# Patient Record
Sex: FEMALE | Race: WHITE | NOT HISPANIC OR LATINO | Employment: FULL TIME | URBAN - METROPOLITAN AREA
[De-identification: names, ages, dates, MRNs, and addresses within clinical notes are randomized per-mention and may not be internally consistent; named-entity substitution may affect disease eponyms.]

---

## 2018-06-01 LAB — PAP SMEAR - HIM PATIENT REPORTED: NEGATIVE

## 2019-02-08 ENCOUNTER — OFFICE VISIT (OUTPATIENT)
Dept: FAMILY MEDICINE | Facility: CLINIC | Age: 29
End: 2019-02-08
Payer: COMMERCIAL

## 2019-02-08 VITALS
RESPIRATION RATE: 16 BRPM | OXYGEN SATURATION: 99 % | WEIGHT: 142.4 LBS | TEMPERATURE: 97.8 F | HEART RATE: 86 BPM | DIASTOLIC BLOOD PRESSURE: 64 MMHG | BODY MASS INDEX: 25.23 KG/M2 | SYSTOLIC BLOOD PRESSURE: 106 MMHG | HEIGHT: 63 IN

## 2019-02-08 DIAGNOSIS — F33.1 MODERATE EPISODE OF RECURRENT MAJOR DEPRESSIVE DISORDER (H): ICD-10-CM

## 2019-02-08 DIAGNOSIS — F41.1 GAD (GENERALIZED ANXIETY DISORDER): ICD-10-CM

## 2019-02-08 DIAGNOSIS — F51.01 PRIMARY INSOMNIA: ICD-10-CM

## 2019-02-08 DIAGNOSIS — Z00.00 ROUTINE HISTORY AND PHYSICAL EXAMINATION OF ADULT: Primary | ICD-10-CM

## 2019-02-08 DIAGNOSIS — N83.201 CYST OF RIGHT OVARY: ICD-10-CM

## 2019-02-08 DIAGNOSIS — E06.3 HYPOTHYROIDISM DUE TO HASHIMOTO'S THYROIDITIS: ICD-10-CM

## 2019-02-08 PROCEDURE — 36415 COLL VENOUS BLD VENIPUNCTURE: CPT | Performed by: FAMILY MEDICINE

## 2019-02-08 PROCEDURE — 80061 LIPID PANEL: CPT | Performed by: FAMILY MEDICINE

## 2019-02-08 PROCEDURE — 84443 ASSAY THYROID STIM HORMONE: CPT | Performed by: FAMILY MEDICINE

## 2019-02-08 PROCEDURE — 99385 PREV VISIT NEW AGE 18-39: CPT | Performed by: FAMILY MEDICINE

## 2019-02-08 PROCEDURE — 82947 ASSAY GLUCOSE BLOOD QUANT: CPT | Performed by: FAMILY MEDICINE

## 2019-02-08 PROCEDURE — 99214 OFFICE O/P EST MOD 30 MIN: CPT | Mod: 25 | Performed by: FAMILY MEDICINE

## 2019-02-08 RX ORDER — PAROXETINE 20 MG/1
20 TABLET, FILM COATED ORAL EVERY MORNING
Qty: 90 TABLET | Refills: 0 | Status: SHIPPED | OUTPATIENT
Start: 2019-02-08 | End: 2020-12-07

## 2019-02-08 RX ORDER — ALPRAZOLAM 1 MG
1 TABLET ORAL 3 TIMES DAILY PRN
COMMUNITY
End: 2019-02-08

## 2019-02-08 RX ORDER — PAROXETINE 20 MG/1
20 TABLET, FILM COATED ORAL EVERY MORNING
COMMUNITY
End: 2019-02-08

## 2019-02-08 RX ORDER — LEVOTHYROXINE SODIUM 25 UG/1
25 TABLET ORAL DAILY
COMMUNITY
End: 2022-05-11

## 2019-02-08 RX ORDER — ALPRAZOLAM 1 MG
1 TABLET ORAL 3 TIMES DAILY PRN
Qty: 30 TABLET | Refills: 0 | Status: SHIPPED | OUTPATIENT
Start: 2019-02-08 | End: 2024-03-01

## 2019-02-08 ASSESSMENT — ENCOUNTER SYMPTOMS
JOINT SWELLING: 0
SORE THROAT: 0
CONSTIPATION: 0
DIZZINESS: 0
HEARTBURN: 0
BREAST MASS: 0
SHORTNESS OF BREATH: 0
NERVOUS/ANXIOUS: 1
PALPITATIONS: 0
EYE PAIN: 0
HEMATURIA: 0
ARTHRALGIAS: 0
FEVER: 0
HEADACHES: 0
NAUSEA: 0
MYALGIAS: 0
WEAKNESS: 0
PARESTHESIAS: 0
COUGH: 0
HEMATOCHEZIA: 0
DYSURIA: 0
FREQUENCY: 0
ABDOMINAL PAIN: 0
CHILLS: 0
DIARRHEA: 0

## 2019-02-08 ASSESSMENT — PATIENT HEALTH QUESTIONNAIRE - PHQ9
SUM OF ALL RESPONSES TO PHQ QUESTIONS 1-9: 13
5. POOR APPETITE OR OVEREATING: SEVERAL DAYS

## 2019-02-08 ASSESSMENT — ANXIETY QUESTIONNAIRES
IF YOU CHECKED OFF ANY PROBLEMS ON THIS QUESTIONNAIRE, HOW DIFFICULT HAVE THESE PROBLEMS MADE IT FOR YOU TO DO YOUR WORK, TAKE CARE OF THINGS AT HOME, OR GET ALONG WITH OTHER PEOPLE: SOMEWHAT DIFFICULT
3. WORRYING TOO MUCH ABOUT DIFFERENT THINGS: NEARLY EVERY DAY
6. BECOMING EASILY ANNOYED OR IRRITABLE: NEARLY EVERY DAY
GAD7 TOTAL SCORE: 15
5. BEING SO RESTLESS THAT IT IS HARD TO SIT STILL: SEVERAL DAYS
2. NOT BEING ABLE TO STOP OR CONTROL WORRYING: NEARLY EVERY DAY
7. FEELING AFRAID AS IF SOMETHING AWFUL MIGHT HAPPEN: SEVERAL DAYS
1. FEELING NERVOUS, ANXIOUS, OR ON EDGE: NEARLY EVERY DAY

## 2019-02-08 ASSESSMENT — MIFFLIN-ST. JEOR: SCORE: 1340.05

## 2019-02-08 NOTE — PROGRESS NOTES
SUBJECTIVE:   CC: Fiona Henry is an 28 year old woman who presents for preventive health visit.     Physical   Annual:     Getting at least 3 servings of Calcium per day:  Yes    Bi-annual eye exam:  Yes    Dental care twice a year:  Yes    Sleep apnea or symptoms of sleep apnea:  None    Diet:  Regular (no restrictions)    Frequency of exercise:  2-3 days/week    Duration of exercise:  15-30 minutes    Taking medications regularly:  Yes    Medication side effects:  None    Additional concerns today:  Yes    PHQ-2 Total Score: 2    Pt requesting refills on her Medications.     Anxiety/depression Follow-Up    Status since last visit: pt struggled with MDD and anxiety/insomnia, and was started on Paxil with significant improvement, and she is taking Xanax as needed, and she takes it about it 2 to 3 times a week.    Other associated symptoms:has been having slightly more panic attacks due to driving on ice.    Complicating factors:   Significant life event: Yes-  Moved from [place] lately.   Current substance abuse: None  Depression symptoms: Yes-    No flowsheet data found.    ASIA-7  Hypothyroidism Follow-up      Since last visit, patient describes the following symptoms: Weight stable, no hair loss, no skin changes, no constipation, no loose stools      Today's PHQ-2 Score:   PHQ-2 ( 1999 [employer]) 2/8/2019   Q1: Little interest or pleasure in doing things 1   Q2: Feeling down, depressed or hopeless 1   PHQ-2 Score 2   Q1: Little interest or pleasure in doing things Several days   Q2: Feeling down, depressed or hopeless Several days   PHQ-2 Score 2       Abuse: Current or Past(Physical, Sexual or Emotional)- No  Do you feel safe in your environment? Yes    Social History     Tobacco Use     Smoking status: Not on file   Substance Use Topics     Alcohol use: Not on file     Alcohol Use 2/8/2019   If you drink alcohol do you typically have greater than 3 drinks per day OR greater than 7 drinks per week? No        Reviewed orders with patient.  Reviewed health maintenance and updated orders accordingly - Yes  Labs reviewed in EPIC  BP Readings from Last 3 Encounters:   02/08/19 106/64    Wt Readings from Last 3 Encounters:   02/08/19 64.6 kg (142 lb 6.4 oz)                  Patient Active Problem List   Diagnosis     Moderate episode of recurrent major depressive disorder (H)     Hypothyroidism due to Hashimoto's thyroiditis     ASIA (generalized anxiety disorder)     Primary insomnia     Cyst of right ovary     History reviewed. No pertinent surgical history.    Social History     Tobacco Use     Smoking status: Never Smoker     Smokeless tobacco: Never Used   Substance Use Topics     Alcohol use: Yes     Comment: Rarely     Family History   Problem Relation Age of Onset     Bipolar Disorder Mother      Diabetes Father      Heart Disease Father         Hx Triple Bypass         Current Outpatient Medications   Medication Sig Dispense Refill     ALPRAZolam (XANAX) 1 MG tablet Take 1 tablet (1 mg) by mouth 3 times daily as needed for anxiety 30 tablet 0     levothyroxine (SYNTHROID/LEVOTHROID) 25 MCG tablet Take 25 mcg by mouth daily       PARoxetine (PAXIL) 20 MG tablet Take 1 tablet (20 mg) by mouth every morning 90 tablet 0     No Known Allergies    Mammogram not appropriate for this patient based on age.    Pertinent mammograms are reviewed under the imaging tab.  History of abnormal Pap smear: NO - age 30-65 PAP every 5 years with negative HPV co-testing recommended     Reviewed and updated as needed this visit by clinical staff         Reviewed and updated as needed this visit by Provider        History reviewed. No pertinent past medical history.   History reviewed. No pertinent surgical history.    Review of Systems   Constitutional: Negative for chills and fever.   HENT: Negative for congestion, ear pain, hearing loss and sore throat.    Eyes: Negative for pain and visual disturbance.   Respiratory: Negative for  cough and shortness of breath.    Cardiovascular: Negative for chest pain, palpitations and peripheral edema.   Gastrointestinal: Negative for abdominal pain, constipation, diarrhea, heartburn, hematochezia and nausea.   Breasts:  Negative for tenderness, breast mass and discharge.   Genitourinary: Negative for dysuria, frequency, genital sores, hematuria, pelvic pain, urgency, vaginal bleeding and vaginal discharge.   Musculoskeletal: Negative for arthralgias, joint swelling and myalgias.   Skin: Negative for rash.   Neurological: Negative for dizziness, weakness, headaches and paresthesias.   Psychiatric/Behavioral: Positive for mood changes. The patient is nervous/anxious.         OBJECTIVE:   There were no vitals taken for this visit.  Physical Exam  GENERAL: healthy, alert and no distress  EYES: Eyes grossly normal to inspection, PERRL and conjunctivae and sclerae normal  HENT: ear canals and TM's normal, nose and mouth without ulcers or lesions  NECK: no adenopathy, no asymmetry, masses, or scars and thyroid normal to palpation  RESP: lungs clear to auscultation - no rales, rhonchi or wheezes  CV: regular rate and rhythm, normal S1 S2, no S3 or S4, no murmur, click or rub, no peripheral edema and peripheral pulses strong  ABDOMEN: soft, nontender, no hepatosplenomegaly, no masses and bowel sounds normal  MS: no gross musculoskeletal defects noted, no edema  SKIN: no suspicious lesions or rashes  NEURO: Normal strength and tone, mentation intact and speech normal  PSYCH: mentation appears normal, affect normal/bright        ASSESSMENT/PLAN:   1. Moderate episode of recurrent major depressive disorder (H)  Refer to pyscyhiatry for medications management. Will give 1 month supply of ativan, and also refill paxil for 90 days.  - MENTAL HEALTH REFERRAL  - Adult; Psychiatry and Medication Management; Psychiatry; Other: Not Listed - Enter Referral Details in Scheduling Comments Below; Patient call to schedule  -  PARoxetine (PAXIL) 20 MG tablet; Take 1 tablet (20 mg) by mouth every morning  Dispense: 90 tablet; Refill: 0    2. Hypothyroidism due to Hashimoto's thyroiditis  Stable, check   - TSH and refill meds after the results are back.    3. ASIA (generalized anxiety disorder)  As above.  - MENTAL HEALTH REFERRAL  - Adult; Psychiatry and Medication Management; Psychiatry; Other: Not Listed - Enter Referral Details in Scheduling Comments Below; Patient call to schedule  - ALPRAZolam (XANAX) 1 MG tablet; Take 1 tablet (1 mg) by mouth 3 times daily as needed for anxiety  Dispense: 30 tablet; Refill: 0    4. Primary insomnia  Talked about sleep hygeiene.    5. Cyst of right ovary      6. Routine history and physical examination of adult  Check below tests.  - Lipid panel reflex to direct LDL Fasting  - Glucose    COUNSELING:  Reviewed preventive health counseling, as reflected in patient instructions       Regular exercise       Healthy diet/nutrition    BP Readings from Last 1 Encounters:   No data found for BP     There is no height or weight on file to calculate BMI.      Weight management plan: Discussed healthy diet and exercise guidelines     has no tobacco history on file.      Counseling Resources:  ATP IV Guidelines  Pooled Cohorts Equation Calculator  Breast Cancer Risk Calculator  FRAX Risk Assessment  ICSI Preventive Guidelines  Dietary Guidelines for Americans, 2010  USDA's MyPlate  ASA Prophylaxis  Lung CA Screening    Vandana Castillo MD  [facility]

## 2019-02-08 NOTE — LETTER
February 11, 2019      Fiona Gregorylisa  [address] [place] [address]        Dear ,    Your tests are all within normal including : cholesterol test, blood sugar and thyroid test.   Resulted Orders   Lipid panel reflex to direct LDL Fasting   Result Value Ref Range    Cholesterol 189 <200 mg/dL    Triglycerides 136 <150 mg/dL      Comment:      Fasting specimen    HDL Cholesterol 42 (L) >49 mg/dL    LDL Cholesterol Calculated 120 (H) <100 mg/dL      Comment:      Above desirable:  100-129 mg/dl  Borderline High:  130-159 mg/dL  High:             160-189 mg/dL  Very high:       >189 mg/dl      Non HDL Cholesterol 147 (H) <130 mg/dL      Comment:      Above Desirable:  130-159 mg/dl  Borderline high:  160-189 mg/dl  High:             190-219 mg/dl  Very high:       >219 mg/dl     TSH   Result Value Ref Range    TSH 0.53 0.40 - 4.00 mU/L   Glucose   Result Value Ref Range    Glucose 84 70 - 99 mg/dL      Comment:      Fasting specimen       If you have any questions or concerns, please call the clinic at the number listed above.       Sincerely,        Vandana Castillo MD

## 2019-02-09 LAB
CHOLEST SERPL-MCNC: 189 MG/DL
GLUCOSE SERPL-MCNC: 84 MG/DL (ref 70–99)
HDLC SERPL-MCNC: 42 MG/DL
LDLC SERPL CALC-MCNC: 120 MG/DL
NONHDLC SERPL-MCNC: 147 MG/DL
TRIGL SERPL-MCNC: 136 MG/DL
TSH SERPL DL<=0.005 MIU/L-ACNC: 0.53 MU/L (ref 0.4–4)

## 2019-02-09 ASSESSMENT — ANXIETY QUESTIONNAIRES: GAD7 TOTAL SCORE: 15

## 2019-03-15 ENCOUNTER — TRANSFERRED RECORDS (OUTPATIENT)
Dept: HEALTH INFORMATION MANAGEMENT | Facility: CLINIC | Age: 29
End: 2019-03-15

## 2019-03-28 ENCOUNTER — HOSPITAL ENCOUNTER (EMERGENCY)
Facility: CLINIC | Age: 29
Discharge: HOME | End: 2019-03-28
Admitting: PHYSICIAN ASSISTANT
Payer: COMMERCIAL

## 2019-03-28 VITALS
SYSTOLIC BLOOD PRESSURE: 108 MMHG | DIASTOLIC BLOOD PRESSURE: 69 MMHG | RESPIRATION RATE: 16 BRPM | TEMPERATURE: 98.6 F | OXYGEN SATURATION: 98 %

## 2019-03-28 DIAGNOSIS — M54.42 BILATERAL LOW BACK PAIN WITH LEFT-SIDED SCIATICA: ICD-10-CM

## 2019-03-28 PROCEDURE — 99283 EMERGENCY DEPT VISIT LOW MDM: CPT

## 2019-03-28 PROCEDURE — 25000132 ZZH RX MED GY IP 250 OP 250 PS 637: Performed by: PHYSICIAN ASSISTANT

## 2019-03-28 RX ORDER — IBUPROFEN 600 MG/1
600 TABLET, FILM COATED ORAL ONCE
Status: COMPLETED | OUTPATIENT
Start: 2019-03-28 | End: 2019-03-28

## 2019-03-28 RX ORDER — METHOCARBAMOL 750 MG/1
750-1500 TABLET, FILM COATED ORAL 3 TIMES DAILY PRN
Qty: 30 TABLET | Refills: 0 | Status: SHIPPED | OUTPATIENT
Start: 2019-03-28 | End: 2019-04-02

## 2019-03-28 RX ORDER — LIDOCAINE 4 G/G
2 PATCH TOPICAL ONCE
Status: DISCONTINUED | OUTPATIENT
Start: 2019-03-28 | End: 2019-03-28 | Stop reason: HOSPADM

## 2019-03-28 RX ORDER — DIAZEPAM 5 MG
5 TABLET ORAL ONCE
Status: COMPLETED | OUTPATIENT
Start: 2019-03-28 | End: 2019-03-28

## 2019-03-28 RX ORDER — LIDOCAINE 50 MG/G
2 PATCH TOPICAL EVERY 24 HOURS
Qty: 10 PATCH | Refills: 0 | Status: SHIPPED | OUTPATIENT
Start: 2019-03-28 | End: 2019-04-02

## 2019-03-28 RX ORDER — METHYLPREDNISOLONE 4 MG
TABLET, DOSE PACK ORAL
Qty: 21 TABLET | Refills: 0 | Status: SHIPPED | OUTPATIENT
Start: 2019-03-28 | End: 2019-04-02

## 2019-03-28 RX ORDER — ACETAMINOPHEN 500 MG
1000 TABLET ORAL ONCE
Status: COMPLETED | OUTPATIENT
Start: 2019-03-28 | End: 2019-03-28

## 2019-03-28 RX ADMIN — LIDOCAINE 2 PATCH: 560 PATCH PERCUTANEOUS; TOPICAL; TRANSDERMAL at 12:43

## 2019-03-28 RX ADMIN — IBUPROFEN 600 MG: 600 TABLET ORAL at 12:42

## 2019-03-28 RX ADMIN — DIAZEPAM 5 MG: 5 TABLET ORAL at 12:43

## 2019-03-28 RX ADMIN — ACETAMINOPHEN 1000 MG: 500 TABLET, FILM COATED ORAL at 12:42

## 2019-03-28 ASSESSMENT — ENCOUNTER SYMPTOMS
NUMBNESS: 0
FEVER: 0
CONSTIPATION: 0
CHILLS: 0
DIARRHEA: 0

## 2019-03-28 NOTE — ED TRIAGE NOTES
Pt presents to ED for evaluation of sharp spasming pain across her lower back.  States the pain radiates down both of her legs.  Pain started on Wednesday morning, pt had a difficult time sleeping last night because of the discomfort.  Pt took two Aleve this morning and also took 1 percocet but reports minimal relief in pain.

## 2019-03-28 NOTE — ED NOTES
AMbulated pt.  Pt is now able to get out of bed, walk, sit and get back into bed.  Pt states her pain is much better than upon arrival.

## 2019-03-28 NOTE — ED PROVIDER NOTES
"  History     Chief Complaint:  Back Pain    The history is provided by the patient.      Fiona Henry is a 28 year old female with a history of anxiety and depression who presents to the emergency department with a friend for evaluation of back pain. Of note, the patient has a history of back spasms that have been able to be controlled at home with Aleve. Starting yesterday morning, after a \"bad night of sleep,\" she endorses worsening back spasms and pain radiating down the left leg. She states that she tried Aleve and 1 Percocet this morning, without relief, prompting her to seek further evaluation here in the ED. Here, the patient complains of the uncontrolled bilateral low back spasms with pain radiating down the left leg in addition to nausea and diaphoresis. She denies any recent falls or trauma to the back in addition to denying any fevers, chills, urinary or bowel movement irregularities, numbness or tingling or history of cancer in herself.     Allergies:  NKDA    Medications:    Alprazolam  Levothyroxine  Paroxetine  Venlafaxine  Loestrin    Past Medical History:    Anxiety  Depression  Insomnia  Ovarian Cyst  Hypothyroidism  PCOS    Past Surgical History:    Tonsillectomy    Family History:    Bipolar Disorder  Diabetes  Heart Disease: Father    Social History:  Marital Status:   [2]  Tobacco Use: No  Alcohol Use: No    Review of Systems   Constitutional: Negative for chills and fever.   Gastrointestinal: Negative for constipation and diarrhea.   Genitourinary: Negative.    Musculoskeletal:        Bilateral Low Back Pain  Left Leg Pain   Neurological: Negative for numbness.   All other systems reviewed and are negative.    Physical Exam     Patient Vitals for the past 24 hrs:   BP Temp Temp src Heart Rate Resp SpO2   03/28/19 1148 120/71 98.6  F (37  C) Oral 97 16 99 %       Physical Exam  General: Alert and cooperative with exam. Tearful and very uncomfortable appearing.   Head:  Scalp is " "NC/AT  Eyes:  No scleral icterus, PERRL  ENT:  The external nose and ears are normal.   Neck:  Normal range of motion without rigidity.  CV:  Regular rate and rhythm    No pathologic murmur, rubs, or gallops.  Resp:  Breath sounds are clear bilaterally.  No crackles, wheezes, rhonchi.    Non-labored, no retractions or accessory muscle use  GI:  Abdomen is soft, no distension, no tenderness, no masses. No peritoneal signs.  Bowel sounds present in all quadrants  :  No suprapubic or flank tenderness  MS:  No lower extremity edema or asymmetric calf swelling.    No midline cervical, thoracic, or lumbar tenderness    Diffuse bilateral spasms in the paralumbar region.   Skin:  Warm and dry, No rash or lesions noted.  Neuro: Oriented. No gross motor deficits.    Strength and sensation grossly intact in all 4 extremities. GCS 15. Gait normal  Psych: Awake. Alert. Normal affect. Appropriate interactions.      Emergency Department Course     Interventions:  1242 Tylenol 1000 mg, PO  1242 Ibuprofen 600 mg, PO  1243 Valium 5 mg, PO  1243 Lidocaine 4% Patch, 2 Patches, Transdermal    Emergency Department Course:  1221 Nursing notes and vitals reviewed. I performed an exam of the patient as documented above.     IV inserted. Medicine administered as documented above.     1321 I rechecked the patient and discussed the results of her workup thus far. Patient's pain is improved.     The patient passed a \"road test\" prior to discharge from the ED.    Findings and plan explained to the patient. Patient discharged home with instructions regarding supportive care, medications, and reasons to return. The importance of close follow-up was reviewed.     I personally answered all related questions prior to discharge.    Impression & Plan      Medical Decision Making:  Fiona Henry is a 28 year old female who presented with low back pain.  Patient is well appearing with normal vitals.  Pain has improved with interventions in the " emergency department.  The patient did not sustain any trauma and has no midline spinous tenderness.  Therefore x-rays are not necessary due to the low likelihood of fracture or subluxation. The patient has not had fever, chills, IV drug use, saddle anesthesia, or bowel or bladder dysfunction.  No history or symptoms of malignancy and no recent surgical intervention.  There is no clinical evidence of cauda equina syndrome, spinal epidural abscess, osteomyelitis, cord compression. No evidence of abdominal pain/tenderness or urinary symptoms and doubt referred pain from GI or  cause.    The neurovascular exam is normal and the patient's symptoms are consistent with musculoskeletal strain with left sided sciatica given radiation of pain down posterior-lateral portion of leg. The patient will be discharged with medications as below to use as directed, and advised to use OTC analgesics.  Ice or heat to the back and stretching exercises.  No heavy lifting, bending or twisting. Return if increasing pain, numbness, weakness, fever, chills, or bowel or bladder dysfunction.  They should schedule follow-up with their doctor within 3 days.     Diagnosis:    ICD-10-CM    1. Bilateral low back pain with left-sided sciatica M54.42        Disposition:  discharged to home    Discharge Medications:     Medication List      Started    lidocaine 5 % patch  Commonly known as:  LIDODERM  2 patches, Transdermal, EVERY 24 HOURS     methocarbamol 750 MG tablet  Commonly known as:  ROBAXIN  750-1,500 mg, Oral, 3 TIMES DAILY PRN     methylPREDNISolone 4 MG tablet therapy pack  Commonly known as:  MEDROL DOSEPAK  Follow Package Directions          Scribe Disclosure:  I, Hu Panda, am serving as a scribe on 3/28/2019 at 12:05 PM to personally document services performed by SAMAN Whipple, based on my observations and the provider's statements to me.     Hu Panda  3/28/2019   [facility] EMERGENCY DEPARTMENT       Fer  Rufus Aguilera PA-C  03/28/19 8201

## 2019-03-28 NOTE — ED AVS SNAPSHOT
[facility] Emergency Department  [address]  [facility] [address]  Phone:  [phone]  Fax:  [phone]                                    Fiona Henry   MRN: [id number]    Department:  [facility] Emergency Department   Date of Visit:  3/28/2019           After Visit Summary Signature Page    I have received my discharge instructions, and my questions have been answered. I have discussed any challenges I see with this plan with the nurse or doctor.    ..........................................................................................................................................  Patient/Patient Representative Signature      ..........................................................................................................................................  Patient Representative Print Name and Relationship to Patient    ..................................................               ................................................  Date                                   Time    ..........................................................................................................................................  Reviewed by Signature/Title    ...................................................              ..............................................  Date                                               Time          22EPIC Rev 08/18

## 2019-04-02 ENCOUNTER — OFFICE VISIT (OUTPATIENT)
Dept: FAMILY MEDICINE | Facility: CLINIC | Age: 29
End: 2019-04-02
Payer: COMMERCIAL

## 2019-04-02 VITALS
SYSTOLIC BLOOD PRESSURE: 103 MMHG | HEART RATE: 101 BPM | DIASTOLIC BLOOD PRESSURE: 73 MMHG | RESPIRATION RATE: 14 BRPM | TEMPERATURE: 97.9 F | WEIGHT: 138 LBS | BODY MASS INDEX: 24.45 KG/M2

## 2019-04-02 DIAGNOSIS — M54.42 ACUTE BILATERAL LOW BACK PAIN WITH LEFT-SIDED SCIATICA: Primary | ICD-10-CM

## 2019-04-02 PROCEDURE — 99213 OFFICE O/P EST LOW 20 MIN: CPT | Performed by: PHYSICIAN ASSISTANT

## 2019-04-02 RX ORDER — CYCLOBENZAPRINE HCL 10 MG
10 TABLET ORAL 3 TIMES DAILY PRN
Qty: 30 TABLET | Refills: 1 | Status: SHIPPED | OUTPATIENT
Start: 2019-04-02 | End: 2019-07-01

## 2019-04-02 NOTE — PROGRESS NOTES
"  SUBJECTIVE:   Fiona Henry is a 28 year old female who presents to clinic today for the following health issues:      ED/UC Followup: Bilateral low back pain with left-sided sciatica    Facility:  [facility]  Date of visit: 3/28/2019  Reason for visit: could not walk due to back pain  Current Status: feeling better, much more mobile.  Has had pinched nerve previously, and resolved a lot quicker.  Not sure what medication is causing nausea.  Experienced \"flushing\" when taking the oral steroid.  Finished steroid today. Of note, does not believe robaxin is adding in symptoms. Has used flexeril in the past with improvement. Taking naproxen bid as well with some improvement .           Problem list and histories reviewed & adjusted, as indicated.  Additional history: as documented    Patient Active Problem List   Diagnosis     Moderate episode of recurrent major depressive disorder (H)     Hypothyroidism due to Hashimoto's thyroiditis     ASIA (generalized anxiety disorder)     Primary insomnia     Cyst of right ovary     History reviewed. No pertinent surgical history.    Social History     Tobacco Use     Smoking status: Never Smoker     Smokeless tobacco: Never Used   Substance Use Topics     Alcohol use: Yes     Comment: Rarely     Family History   Problem Relation Age of Onset     Bipolar Disorder Mother      Diabetes Father      Heart Disease Father         Hx Triple Bypass         Current Outpatient Medications   Medication Sig Dispense Refill     ALPRAZolam (XANAX) 1 MG tablet Take 1 tablet (1 mg) by mouth 3 times daily as needed for anxiety 30 tablet 0     cyclobenzaprine (FLEXERIL) 10 MG tablet Take 1 tablet (10 mg) by mouth 3 times daily as needed for muscle spasms 30 tablet 1     levothyroxine (SYNTHROID/LEVOTHROID) 25 MCG tablet Take 25 mcg by mouth daily       lidocaine (LIDODERM) 5 % patch Place 2 patches onto the skin every 24 hours for 5 days 10 patch 0     PARoxetine (PAXIL) 20 MG tablet Take 1 " tablet (20 mg) by mouth every morning 90 tablet 0     VENLAFAXINE HCL PO        No Known Allergies  BP Readings from Last 3 Encounters:   04/02/19 103/73   03/28/19 108/69   02/08/19 106/64    Wt Readings from Last 3 Encounters:   04/02/19 62.6 kg (138 lb)   02/08/19 64.6 kg (142 lb 6.4 oz)                    Reviewed and updated as needed this visit by clinical staff  Tobacco  Allergies  Meds  Problems  Med Hx  Surg Hx  Fam Hx  Soc Hx        Reviewed and updated as needed this visit by Provider  Tobacco  Allergies  Meds  Problems  Med Hx  Surg Hx  Fam Hx         ROS:  Constitutional, msk, neuro, cardiovascular, pulmonary, gi and gu systems are negative, except as otherwise noted.    OBJECTIVE:     /73 (BP Location: Right arm, Patient Position: Chair, Cuff Size: Adult Regular)   Pulse 101   Temp 97.9  F (36.6  C) (Oral)   Resp 14   Wt 62.6 kg (138 lb)   LMP 02/28/2019 (Exact Date)   BMI 24.45 kg/m    Body mass index is 24.45 kg/m .  GENERAL: healthy, alert and no distress  Comprehensive back pain exam:  Tenderness of L1-S1, paralumbar muscular, left sciatic notch and left SI joint, Pain limits the following motions: flexion and bilateral rotation, Lower extremity strength functional and equal on both sides, Lower extremity reflexes within normal limits bilaterally, Lower extremity sensation normal and equal on both sides and Straight leg raise negative bilaterally  PSYCH: mentation appears normal, affect normal/bright    Diagnostic Test Results:  none     ASSESSMENT/PLAN:     (M54.42) Acute bilateral low back pain with left-sided sciatica  (primary encounter diagnosis)  Comment: improved, but symptoms continued. Will continue scheduled nsaids and will change to prn flexeril. Therapy recommended as well. If no continued improvement over the next 4 weeks or worsening, MRI to be obtained.   Plan: cyclobenzaprine (FLEXERIL) 10 MG tablet, VETO         PT, HAND, AND CHIROPRACTIC REFERRAL         -Medication use and side effects discussed with the patient. Patient is in complete understanding and agreement with plan.       Follow up: as above     Salbador Pabon PA-C  [facility]

## 2019-04-02 NOTE — PATIENT INSTRUCTIONS
Kirk Murillo    [address]  [place] [address]    Phone: [phone]  Email: [email or web address]          Patient Education     Back Care Tips    Caring for your back  These are things you can do to prevent a recurrence of acute back pain and to reduce symptoms from chronic back pain:    Maintain a healthy weight. If you are overweight, losing weight will help most types of back pain.    Exercise is an important part of recovery from most types of back pain. The muscles behind and in front of the spine support the back. This means strengthening both the back muscles and the abdominal muscles will provide better support for your spine.     Swimming and brisk walking are good overall exercises to improve your fitness level.    Practice safe lifting methods (below).    Practice good posture when sitting, standing and walking. Avoid prolonged sitting. This puts more stress on the lower back than standing or walking.    Wear quality shoes with sufficient arch support. Foot and ankle alignment can affect back symptoms. Women should avoid wearing high heels.    Therapeutic massage can help relax the back muscles without stretching them.    During the first 24 to 72 hours after an acute injury or flare-up of chronic back pain, apply an ice pack to the painful area for 20 minutes and then remove it for 20 minutes, over a period of 60 to 90 minutes, or several times a day. As a safety precaution, do not use a heating pad at bedtime. Sleeping on a heating pad can lead to skin burns or tissue damage.    You can alternate ice and heat therapies.  Medicines  Talk to your healthcare provider before using medicines, especially if you have other medical problems or are taking other medicines.    You may use acetaminophen or ibuprofen to control pain, unless your healthcare provider prescribed other pain medicine. If you have chronic conditions like diabetes, liver or kidney disease, stomach ulcers, or gastrointestinal  bleeding, or are taking blood thinners, talk with your healthcare provider before taking any medicines.    Be careful if you are given prescription pain medicines, narcotics, or medicine for muscle spasm. They can cause drowsiness, affect your coordination, reflexes, and judgment. Do not drive or operate heavy machinery while taking these types of medicines. Take prescription pain medicine only as prescribed by your healthcare provider.  Lumbar stretch  Here is a simple stretching exercise that will help relax muscle spasm and keep your back more limber. If exercise makes your back pain worse, don t do it.    Lie on your back with your knees bent and both feet on the ground.    Slowly raise your left knee to your chest as you flatten your lower back against the floor. Hold for 5 seconds.    Relax and repeat the exercise with your right knee.    Do 10 of these exercises for each leg.  Safe lifting method    Don t bend over at the waist to lift an object off the floor.  Instead, bend your knees and hips in a squat.     Keep your back and head upright    Hold the object close to your body, directly in front of you.    Straighten your legs to lift the object.     Lower the object to the floor in the reverse fashion.    If you must slide something across the floor, push it.  Posture tips  Sitting  Sit in chairs with straight backs or low-back support. Keep your knees lower than your hips, with your feet flat on the floor.  When driving, sit up straight. Adjust the seat forward so you are not leaning toward the steering wheel.  A small pillow or rolled towel behind your lower back may help if you are driving long distances.   Standing  When standing for long periods, shift most of your weight to one leg at a time. Alternate legs every few minutes.   Sleeping  The best way to sleep is on your side with your knees bent. Put a low pillow under your head to support your neck in a neutral spine position. Avoid thick pillows  that bend your neck to one side. Put a pillow between your legs to further relax your lower back. If you sleep on your back, put pillows under your knees to support your legs in a slightly flexed position. Use a firm mattress. If your mattress sags, replace it, or use a 1/2-inch plywood board under the mattress to add support.  Follow-up care  Follow up with your healthcare provider, or as advised.  If X-rays, a CT scan or an MRI scan were taken, they will be reviewed by a radiologist. You will be notified of any new findings that may affect your care.  Call 911  Call 911 if any of the following occur:    Trouble breathing    Confusion    Very drowsy    Fainting or loss of consciousness    Rapid or very slow heart rate    Loss of  bowel or bladder control  When to seek medical advice  Call your healthcare provider right away if any of the following occur:    Pain becomes worse or spreads to your arms or legs    Weakness or numbness in one or both arms or legs    Numbness in the groin area  Date Last Reviewed: 6/1/2016 2000-2018 [employer]. [address] [place] [address]. All rights reserved. This information is not intended as a substitute for professional medical care. Always follow your healthcare professional's instructions.

## 2019-04-04 ENCOUNTER — TELEPHONE (OUTPATIENT)
Dept: FAMILY MEDICINE | Facility: CLINIC | Age: 29
End: 2019-04-04

## 2019-04-04 NOTE — TELEPHONE ENCOUNTER
Panel Management Review      Patient has the following on her problem list:     Depression / Dysthymia review    Measure:  Needs PHQ-9 score of 4 or less during index window.  Administer PHQ-9 and if score is 5 or more, send encounter to provider for next steps.    5 - 7 month window range:     PHQ-9 SCORE 2/8/2019   PHQ-9 Total Score 13       If PHQ-9 recheck is 5 or more, route to provider for next steps.    Patient is due for:  None      Composite cancer screening  Chart review shows that this patient is due/due soon for the following Pap Smear  Summary:    Patient is due/failing the following:   PAP    Action needed:   Patient needs office visit for PAP.    Type of outreach:    OUTREACH PT    Questions for provider review:    None                                                                                                                                    Dinora Bourgeois CMA       Chart routed to PANEL POOL .

## 2019-04-04 NOTE — LETTER
[address] [id number] [phone]  May 13, 2019    Fiona Henry  [address] [place] [address]    Dear Fiona,    I care about your health and have reviewed your health plan. I have reviewed your medical conditions, medication list, and lab results and am making recommendations based on this review, to better manage your health.    You are in particular need of attention regarding:  -Cervical Cancer Screening    I am recommending that you:  {recommendations:-schedule a PAP SMEAR EXAM which is due.  Please disregard this reminder if you have had this exam elsewhere within the last year.  It would be helpful for us to have a copy of your recent pap smear report in our file so that we can best coordinate your care.    Here is a list of Health Maintenance topics that are due now or due soon:  Health Maintenance Due   Topic Date Due     DEPRESSION ACTION PLAN  06/17/2007     HIV SCREEN (SYSTEM ASSIGNED)  06/17/2007     PAP SCREENING Q3 YR (SYSTEM ASSIGNED)  06/17/2010     DTAP/TDAP/TD IMMUNIZATION (1 - Tdap) 06/17/2014       Please call us at [phone] (or use GoodRx) to address the above recommendations.     Thank you for trusting [facility] and we appreciate the opportunity to serve you.  We look forward to supporting your healthcare needs in the future.    Healthy Regards,    Vandana Castillo MD

## 2019-04-05 ENCOUNTER — THERAPY VISIT (OUTPATIENT)
Dept: PHYSICAL THERAPY | Facility: CLINIC | Age: 29
End: 2019-04-05
Payer: COMMERCIAL

## 2019-04-05 DIAGNOSIS — M54.42 ACUTE BILATERAL LOW BACK PAIN WITH LEFT-SIDED SCIATICA: ICD-10-CM

## 2019-04-05 PROCEDURE — 97161 PT EVAL LOW COMPLEX 20 MIN: CPT | Mod: GP | Performed by: PHYSICAL THERAPIST

## 2019-04-05 PROCEDURE — 97110 THERAPEUTIC EXERCISES: CPT | Mod: GP | Performed by: PHYSICAL THERAPIST

## 2019-04-05 NOTE — PROGRESS NOTES
Addendum:  Patient failed to schedule any follow up appointments after her initial appointment and will be discharged with a minimal home exercise program.    [facility] Initial Evaluation  Subjective:  The history is provided by the patient. No  was used.   Fiona Henry is a 28 year old female with a lumbar condition.  Condition occurred with:  Insidious onset.  Condition occurred: for unknown reasons.  This is a recurrent condition  Patient reports a history of low back pain and spasms in the past with PT 6-7 years ago for a bulged disc treated with TENS and extension exercises.  She has also done chiropractic care, but has not had an adjustment since December 2018. She reports waking on March 27th, 2019 with mild back stiffness and pain and worked with increasing pain throughout the day. That night she slept poorly and she woke on March 28th with severe bilateral low back pain and spasm and had a hard time getting out of bed and waking.Pain started to radiate into the left leg to the foot.  She went to the ED and was given Lidocaine patches, muscle relaxants and a Medrol pack. She rest and had 4 days of work and used ice. Her pain is getting better, but still is having pain. Chief complaints are of intermittent L > R LBP with radiation into the L lateral thigh to the knee. She denies tingling, numbness or weakness. She does have stiffness in spine extension..    Patient reports pain:  Lumbar spine left and lumbar spine right.  Radiates to:  Gluteals left and thigh left.  Pain is described as aching, sharp and cramping and is intermittent and reported as 6/10.  Associated symptoms:  Loss of motion. Pain is the same all the time.  Symptoms are exacerbated by bending, sitting, lifting, standing, walking and certain positions and relieved by muscle relaxants, ice, NSAID's, analgesics and heat.  Since onset symptoms are gradually improving.    Previous treatment includes  physical therapy and chiropractic.  There was moderate improvement following previous treatment.    Pertinent medical history includes:  Depression, thyroid problems and overweight.  Medical allergies: no.  Other surgeries include:  No.  Current medications:  Anti-depressants, anti-inflammatory, muscle relaxants and thyroid medication.  Current occupation is [employer]s' .  Patient is working in normal job without restrictions.  Primary job tasks include:  Prolonged standing and prolonged sitting (computer work and pushing/pulling).                                Objective:  Standing Alignment:        Lumbar:  Lordosis decr (slouched sitting posture)                           Lumbar/SI Evaluation  ROM:    AROM Lumbar:   Flexion:          100% no change in pain  Ext:                    50% with centralizing pain    Side Bend:        Left:     Right:   Rotation:           Left:     Right:   Side Glide:        Left:  100% with increased pain    Right:  100%        Strength: poor abdominal control with pain on contraction, glut medius grade 4/5 B, glut max grade 4+/5.   Lumbar Myotomes:  normal            Lumbar DTR's:  not assessed      Cord Signs:  normal      Neural Tension/Mobility:    Left side:  SLR; SLR w/DF and Slump positive.     Lumbar Palpation:  normal                                                         General     ROS    Assessment/Plan:    Patient is a 28 year old female with lumbar complaints.    Patient has the following significant findings with corresponding treatment plan.                Diagnosis 1:  Low back pain with L radiculopathy    Pain -  hot/cold therapy, education and directional preference exercise  Decreased ROM/flexibility - therapeutic exercise  Decreased strength - therapeutic exercise and therapeutic activities  Impaired muscle performance - neuro re-education  Decreased function - therapeutic activities  Impaired posture - neuro re-education    Therapy Evaluation Codes:    1) History comprised of:   Personal factors that impact the plan of care:      patient seems resistant to treatment ideas presented.   2)  Examination of Body Systems comprised of:   Body structures and functions that impact the plan of care:      Lumbar spine.   Activity limitations that impact the plan of care are:      Bending, Driving, Dressing, Lifting, Sitting, Standing, Walking, Working and Sleeping.  3) Clinical presentation characteristics are:   Stable/Uncomplicated.  4) Decision-Making    Low complexity using standardized patient assessment instrument and/or measureable assessment of functional outcome.  Cumulative Therapy Evaluation is: Low complexity.    Previous and current functional limitations:  (See Goal Flow Sheet for this information)    Short term and Long term goals: (See Goal Flow Sheet for this information)     Communication ability:  Patient appears to be able to clearly communicate and understand verbal and written communication and follow directions correctly.  Treatment Explanation - The following has been discussed with the patient:   RX ordered/plan of care  Anticipated outcomes  Possible risks and side effects  This patient would benefit from PT intervention to resume normal activities.   Rehab potential is good.    Frequency:  1 X week, once daily  Duration:  for 6 weeks  Discharge Plan:  Achieve all LTG.  Independent in home treatment program.  Reach maximal therapeutic benefit.    Please refer to the daily flowsheet for treatment today, total treatment time and time spent performing 1:1 timed codes.

## 2019-04-19 ENCOUNTER — HEALTH MAINTENANCE LETTER (OUTPATIENT)
Age: 29
End: 2019-04-19

## 2019-04-29 PROBLEM — M54.42 ACUTE BILATERAL LOW BACK PAIN WITH LEFT-SIDED SCIATICA: Status: RESOLVED | Noted: 2019-04-05 | Resolved: 2019-04-29

## 2019-05-13 NOTE — TELEPHONE ENCOUNTER
Type of outreach:  Phone, left message for patient to call back.  and Sent letter.  Shari Schoenberger, CMA

## 2019-05-13 NOTE — TELEPHONE ENCOUNTER
Pt returned call-informed of message, was told she had a pap smear last June 2018 but could not remember name of clinic. Stated she was on vacation and didn't have access to that information.    Leticia Argueta/GARRICK

## 2019-05-17 ENCOUNTER — TELEPHONE (OUTPATIENT)
Dept: FAMILY MEDICINE | Facility: CLINIC | Age: 29
End: 2019-05-17

## 2019-05-17 NOTE — TELEPHONE ENCOUNTER
To Salbador Pabon:    This pt has been to clinic twice, but you last saw her 4/2/19.  Would you be willing to write requested letter below, or possibly fit in for a forms visit or Telephone visitbol today?    Amina Diallo RN

## 2019-05-17 NOTE — TELEPHONE ENCOUNTER
Patient is requesting a letter be sent to [facility] Fax [phone] stating that she is being treated for depression and anxiety.  Please list the medications she is using and that she is stable and safe to work.  Please call Fiona with questions.    Alison Garcia

## 2019-05-17 NOTE — TELEPHONE ENCOUNTER
Pt calling for an update on her letter. She is to start work on Monday 5/20 and needs a letter stating that she is being treated for depression & anxiety and is able to function as a  (same job, [employer])    Please call pt for questions at [phone] and when letter is faxed.       Lj Bentley   05/17/19 1:25 PM

## 2019-05-17 NOTE — TELEPHONE ENCOUNTER
Patient was referred to psych and is now seeing them for symptoms? Did she request this from them since they are now managing her symptoms?    -Bolivar Pabon, PAC

## 2019-05-17 NOTE — TELEPHONE ENCOUNTER
Spoke to pt, who states she would really like to start work on Monday, and has never had to write a letter for this ebfore.  Pt states she feels like she is doing well, with zero issues, and has a regular follow up at [facility] in June.    She has tried to call [facility] for the past 2 days since she was told she needed this, and has not been able to get a letter from them.    Printed letter, Sourav Pabon signed. Faxed to [facility] and notified pt.  Pt will call back if further concerns.  Amina Diallo RN

## 2019-05-17 NOTE — LETTER
May 17, 2019      Fiona Henry  [address] [place] [address]        To Whom It May Concern,    [facility], [phone]    Pt is being treated through [facility] for depression and anxiety.  Her current med list includes Paxil, venlafaxine and Xanex prn.   No known concerns about Fiona's ability to work at this time.           Sincerely,        SAMAN Licea

## 2019-06-18 ENCOUNTER — MYC MEDICAL ADVICE (OUTPATIENT)
Dept: FAMILY MEDICINE | Facility: CLINIC | Age: 29
End: 2019-06-18

## 2019-06-18 DIAGNOSIS — M54.42 ACUTE BILATERAL LOW BACK PAIN WITH LEFT-SIDED SCIATICA: Primary | ICD-10-CM

## 2019-06-18 NOTE — TELEPHONE ENCOUNTER
RAUL, see [employer] message and advise, also has PT order but wants PT with traction, INFORM pt when final    M54.42) Acute bilateral low back pain with left-sided sciatica  (primary encounter diagnosis)  Comment: improved, but symptoms continued. Will continue scheduled nsaids and will change to prn flexeril. Therapy recommended as well. If no continued improvement over the next 4 weeks or worsening, MRI to be obtained.   Plan: cyclobenzaprine (FLEXERIL) 10 MG tablet, VETO         PT, HAND, AND CHIROPRACTIC REFERRAL        -Medication use and side effects discussed with the patient. Patient is in complete understanding and agreement with plan.        Last office visit: 4/2/2019 with prescribing provider:  back   Future Office Visit:    Mayra Walker RN, BSN  Message handled by Nurse Triage.

## 2019-06-28 ENCOUNTER — HOSPITAL ENCOUNTER (OUTPATIENT)
Dept: MRI IMAGING | Facility: CLINIC | Age: 29
Discharge: HOME | End: 2019-06-28
Attending: PHYSICIAN ASSISTANT | Admitting: PHYSICIAN ASSISTANT
Payer: COMMERCIAL

## 2019-06-28 DIAGNOSIS — M54.42 ACUTE BILATERAL LOW BACK PAIN WITH LEFT-SIDED SCIATICA: ICD-10-CM

## 2019-06-28 PROCEDURE — 72148 MRI LUMBAR SPINE W/O DYE: CPT

## 2019-07-01 DIAGNOSIS — M54.42 ACUTE BILATERAL LOW BACK PAIN WITH LEFT-SIDED SCIATICA: ICD-10-CM

## 2019-07-01 RX ORDER — CYCLOBENZAPRINE HCL 10 MG
TABLET ORAL
Qty: 30 TABLET | Refills: 1 | Status: SHIPPED | OUTPATIENT
Start: 2019-07-01 | End: 2019-09-08

## 2019-07-01 NOTE — TELEPHONE ENCOUNTER
Last Written Prescription Date:  4.2.19  Last Fill Quantity: 30,  # refills: 1   Last office visit: 4/2/2019 with prescribing provider:  [place] Office Visit:      Requested Prescriptions   Pending Prescriptions Disp Refills     cyclobenzaprine (FLEXERIL) 10 MG tablet [Pharmacy Med Name: CYCLOBENZAPR 10MG   TAB] 30 tablet 1     Sig: TAKE 1 TABLET BY MOUTH THREE TIMES DAILY AS NEEDED FOR MUSCLE SPASM       There is no refill protocol information for this order        Routing refill request to provider for review/approval because:  Drug not on the [facility] refill protocol     Sherlyn Hewitt RN, BS  Clinical Nurse Triage.

## 2019-07-09 ENCOUNTER — OFFICE VISIT (OUTPATIENT)
Dept: PALLIATIVE MEDICINE | Facility: CLINIC | Age: 29
End: 2019-07-09
Payer: COMMERCIAL

## 2019-07-09 VITALS
BODY MASS INDEX: 25.3 KG/M2 | WEIGHT: 142.8 LBS | OXYGEN SATURATION: 100 % | DIASTOLIC BLOOD PRESSURE: 73 MMHG | HEART RATE: 98 BPM | SYSTOLIC BLOOD PRESSURE: 107 MMHG

## 2019-07-09 DIAGNOSIS — M54.16 LUMBAR RADICULOPATHY: Primary | ICD-10-CM

## 2019-07-09 PROCEDURE — 99207 ZZC CONSULT E&M CHANGED TO INITIAL LEVEL: CPT | Performed by: PHYSICAL MEDICINE & REHABILITATION

## 2019-07-09 PROCEDURE — 99204 OFFICE O/P NEW MOD 45 MIN: CPT | Performed by: PHYSICAL MEDICINE & REHABILITATION

## 2019-07-09 RX ORDER — GABAPENTIN 300 MG/1
600 CAPSULE ORAL 3 TIMES DAILY
Qty: 180 CAPSULE | Refills: 0 | Status: SHIPPED | OUTPATIENT
Start: 2019-07-09 | End: 2019-08-27

## 2019-07-09 ASSESSMENT — PAIN SCALES - GENERAL: PAINLEVEL: SEVERE PAIN (7)

## 2019-07-09 NOTE — PATIENT INSTRUCTIONS
Thank you for coming to [facility] for your care. It is my goal to partner with you to help you reach your optimal state of health.     You were seen today for your back pain. As discussed during your visit these are the recommendations:    1. Medications:  - Start gabapentin as follows:  1 tab= 300mg    AM   PM   Bedtime  0   0   300mg (1 tab).  After 5 days, increase as tolerated to  the next line  300mg (1 tab)  0   300mg (1 tab).  After 5 days, increase as tolerated to the next line  300mg (1 tab)  300mg (1 tab)  300mg (1 tab).  After 5 days, increase as tolerated to the next line  300mg (1 tabs)  300mg (1 tabs)  600mg (2 tabs).  After 5 days, increase as tolerated to the next line  600mg (2 tabs)  300mg (1 tab)  600mg (2 tabs).  After 5 days, increase as tolerated to the next line  600mg (2 tabs)  600 mg (2 tabs)  600 mg (2 tabs).  Call with any problems or when you are at this dose.      Caution for sedation.    Do not drive until you know how the medication affects you. Avoid activities that require mental alertness or coordination until you realize the effects of the medication as it can cause dizziness, sleepiness, fatigue and incoordination.    You can go slower if you need to or increasing only one dose at a time.    Do not stop abruptly once at higher doses.  This medication must be tapered off. Speak with your pain provider prior to discontinuing    Although very uncommon, If you experience changes in your mood, personality, worsening depression, suicidal or homicidal thoughts seek medical care immediately.    Avoid use of alcohol with this medication as it can cause worsening sedation.      2. Therapies: Start physical therapy.   3. Procedures: If you are interested can try a lumbar epidural injection. If you do want to try it you can call our clinic and I will order that for you.   4. Imaging/Diagnostic Studies: no new imaging needed.   5. Other: order placed to try acupuncture.  You can schedule this on your way out today.  You can continue using your TENS unit to see if it provides some benefit.   6. Follow up: if needed    ----------------------------------------------------------------  Clinic Number:  [phone]     Call with any questions about your care and for scheduling assistance.     Calls are returned Monday through Friday between 8 AM and 4:30 PM. We usually get back to you within 2 business days depending on the issue/request.    If we are prescribing your medications:    For opioid medication refills, call the clinic or send a [employer] message 7 days in advance.  Please include:    Name of requested medication    Name of the pharmacy.    For non-opioid medications, call your pharmacy directly to request a refill. Please allow 3-4 days to be processed.     Per [place] State Law:    All controlled substance prescriptions must be filled within 30 days of being written.      For those controlled substances allowing refills, pickup must occur within 30 days of last fill.      We believe regular attendance is key to your success in our program!      Any time you are unable to keep your appointment we ask that you call us at least 24 hours in advance to cancel.This will allow us to offer the appointment time to another patient.     Multiple missed appointments may lead to dismissal from the clinic.

## 2019-07-09 NOTE — PROGRESS NOTES
"[facility]    Date of visit: 7/9/2019    Primary Care Provider: Dr. Vandana Castillo    Reason for consultation:    Fiona Henry is a 29 year old female who is seen in consultation today at the request of Salbador Pabon PA-C.    Consultation and Evaluation for: Medical spine evaluation     Review of [place] Prescription Monitoring Program (): Today I have also reviewed the patient's history of controlled substance use, as provided by [place] licensed pharmacies and prescriber dispensers.    Review of Pain Questionnaire: Please see the [facility] health questionnaire, which the patient completed and reviewed with me in detail.    Review of Electronic Chart: Today I have also reviewed available medical information in the patient's medical record at [facility] including relevant provider notes, laboratory work, and imaging.     Chief Complaint:    Back pain    Pain history:  Fiona Henry is a 29 year old female with a PMH significant for hypothyroidism, ASIA, depression who first started having problems with bilateral low back pain with radiation into the right leg starting at the end of March 2019. She had a \"bad night of sleep\" and woke up with worsening back spasms and pain radiating into the right leg. She went to the ED for evaluation. She was given a medrol dosepak, methocarbamol and lidocaine 5% patches. These were helping for a while. Then she developed recurrence of symptoms in June after she was coughing a lot due to bronchitis. Symptoms have been ongoing since that time. She has mainly right sided back and leg pain radiating into the lateral thigh, into the shin and top of the foot. At this time the leg symptoms are more bothersome than the back symptoms. She describes the pain as being constant and aching, shooting, throbbing, sharp, stabbing, tender and miserable in quality. It is aggravated by lifting anything, staying in one position for too long, " moving the leg to put on pants. It is somewhat relieved with stretching. She has been doing stretching, core strengthening exercises, walking, wearing a back brace, and using NSAIDs and muscle relaxants to help with her pain. On average pain is 8/10 in severity and currently it is 7/10.     She does have a history of back pain which began about 6-7 years ago and was insidious in onset. She would have intermittent flares of pain over the years. Has not had symptoms like this before.       Denies red flags including: bowel or bladder symptoms, fever, chills, saddle anesthesia, profound motor loss, history of cancer, history of immune compromise, weight loss.     Current medication treatments include:  Flexeril 10 mg daily - takes at night - [facility]  Aleve daily to bid - [facility]  Tiger balm - [facility]  Lidocaine patches - [place]    Pain medications are being prescribed by NA.    Previous medication treatments included:  None    Other treatments have included:  Behavioral interventions: None  PT: No - will be starting soon.   Manual Medicine: yes - goes sporadically - [facility]  Surgeries: none  Acupuncture: yes - [facility]  TENs Unit: Yes - [facility]  Injections: None    Past Medical History:  Hypothyroidism  Depression  ASIA  Insomnia  Cyst of right ovary    Past Surgical History:  No past surgical history on file.     Medications:  Current Outpatient Medications   Medication Sig Dispense Refill     ALPRAZolam (XANAX) 1 MG tablet Take 1 tablet (1 mg) by mouth 3 times daily as needed for anxiety 30 tablet 0     cyclobenzaprine (FLEXERIL) 10 MG tablet TAKE 1 TABLET BY MOUTH THREE TIMES DAILY AS NEEDED FOR MUSCLE SPASM 30 tablet 1     levothyroxine (SYNTHROID/LEVOTHROID) 25 MCG tablet Take 25 mcg by mouth daily       PARoxetine (PAXIL) 20 MG tablet Take 1 tablet (20 mg) by mouth every morning 90 tablet 0     VENLAFAXINE HCL PO        Allergies:   No Known Allergies    Social History:  Home situation: Lives in [place], [place].   Occupation/Schooling:    Tobacco use: none  Alcohol use: occassional  Drug use: none    Family history:  Family History   Problem Relation Age of Onset     Bipolar Disorder Mother      Diabetes Father      Heart Disease Father         Hx Triple Bypass     Diagnostic Tests:  Lumbar MRI completed on 6/28/2019 showed:  FINDINGS: There are five lumbar-type vertebral bodies assumed for the  purposes of this dictation. The distal spinal cord and cauda equina  appear normal.      Alignment of the lumbar spine is normal. No loss of vertebral body  height. There are no destructive osseous lesions. Mild loss of  intervertebral disc height with disc desiccation and Modic type  degenerative endplate changes at L4-L5. Mild STIR hyperintense  endplate edema at L4-L5.      Level by level as follows:      T12-L1: No significant disc herniation. No spinal canal or neural  foraminal narrowing.      L1-L2: No significant disc herniation. No spinal canal or neural  foraminal narrowing.      L2-L3: No significant disc herniation. No spinal canal or neural  foraminal narrowing.      L3-L4: No significant disc herniation. No spinal canal or neural  foraminal narrowing.      L4-L5: Central disc protrusion slightly asymmetric towards the right.  This abuts and may slightly compress the traversing right L5 nerve  root in the lateral recess. No significant spinal canal or neural  foraminal narrowing.      L5-S1: No significant disc herniation. No spinal canal or neural  foraminal narrowing.      Paraspinous soft tissues: Normal.                                                                       IMPRESSION: Single level degenerative changes with central disc  protrusion at L4-L5 asymmetric towards the right which may slightly  compress the traversing right S1 nerve root in the lateral recess.  Other disc levels appear normal. Otherwise normal MRI of the lumbar  spine.      Review of Systems:    POSTIVE IN BOLD  GENERAL: fever/chills, fatigue,  general unwell feeling, weight gain/loss.  HEAD/EYES:  headache, dizziness, or vision changes.    EARS/NOSE/THROAT:  Nosebleeds, hearing loss, sinus infection, earache, tinnitus.  IMMUNE:  Allergies, cancer, immune deficiency, or infections.  SKIN:  Urticaria, rash, hives  HEME/Lymphatic:   anemia, easy bruising, easy bleeding.  RESPIRATORY:  cough, wheezing, or shortness of breath  CARDIOVASCULAR/Circulation:  Extremity edema, syncope, hypertension, tachycardia, or angina.  GASTROINTESTINAL:  abdominal pain, nausea/emesis, diarrhea, constipation,  hematochezia, or melena.  ENDOCRINE:  Diabetes, steroid use,  thyroid disease or osteoporosis.  MUSCULOSKELETAL: neck pain, back pain, arthralgia, arthritis, or gout.  GENITOURINARY:  frequency, urgency, dysuria, difficulty voiding, hematuria or incontinence.  NEUROLOGIC:  weakness, numbness, paresthesias, seizure, tremor, stroke or memory loss.  PSYCHIATRIC:  depression, anxiety, stress, suicidal thoughts or mood swings.     Physical Exam:  Vitals:    07/09/19 1526   BP: 107/73   Pulse: 98   SpO2: 100%   Weight: 64.8 kg (142 lb 12.8 oz)     Exam:  Constitutional: healthy, alert, active and no distress  Head: normocephalic. Atraumatic.   Eyes: no redness or jaundice noted   ENT: oropharnx normal.  MMM.  Neck supple.    Respiratory: breathing unlabored on room air  Gastrointestinal: soft, non-tender  : deferred  Skin: no suspicious lesions or rashes  Psychiatric: mentation appears normal and affect normal/bright    Musculoskeletal exam:  Gait/Station/Posture: Normal   Thoracic spine:  Normal   Lumbar spine: Normal ROM.   Myofascial tenderness: Mild tenderness in lumbar paraspinals and gluteal muscles on right.   Straight leg exam: negative bilaterally  Syed's maneuver: negative bilaterally  Sacroiliac (SI) joint tenderness: negative bilaterally  Greater trochanteric tenderness: none    Bilateral hip motion without discomfort     Neurologic exam:  Motor:  5/5 UE and LE  strength    Reflexes:     Biceps:     R:  2/4 L: 2/4   Brachioradialis   R:  2/4 L: 2/4   Triceps:  R:  2/4 L: 2/4   Patella:  R:  2/4 L: 2/4   Achilles:  R:  2/4 L: 2/4   Sensory:  (lower extremities):   Light touch: normal    Allodynia: absent    Hyperalgesia: absent     Assessment:  Fiona Henry is a 29 year old female with a past medical history significant for hypothyroidism, ASIA, depression  who presents with complaints of back and right leg pain.     1. Back pain: Fiona has a history of chronic low back pain with intermittent flare ups over the years. More recently in March of this year she developed new symptoms of back pain radiating into the right lower extremity and continues to have those symptoms now. Leg symptoms are more bothersome than the low back. Neuro exam is normal. No red flag symptoms. MRI of lumbar spine shows a disc protrusion at L4-5 which is asymmetric towards the right and slightly compresses or abuts the traversing right L5 nerve root in the lateral recess. Etiology of symptoms is consistent with lumbar radiculitis/radiculopathy.     Plan:  Diagnosis reviewed, treatment options addressed, and risks/benefits discussed. The patient was involved in shared decision making regarding the plan as laid out below.    1. Education: The patient was educated as to the natural history of their disorder. Reassurance was given and the patient was encouraged to engage in activity and movement as able.  2. Physical Therapy:  Discussed that she should start PT soon. She has an external location that she is planning on going to.   3. Diagnostic Studies:  No new imaging needed.  4. Medication Management:    1. Start gabapentin 300 mg at night and titrate up to 600 mg TID if well tolerated.   5. Further procedures recommended: Discussed a right L4-5 Transforaminal epidural steroid injection. She wants to try more conservative measures first before considering an BABITA.   6. Complementary treatments: she  is interested in trying acupuncture. Order placed today.   7. Other: Continue using TENS unit to see if it provides some benefit  8. Follow up: She will call if interested in trying as BABITA. Otherwise she will follow up with me in clinic in 6-8 weeks.      Total time spent face to face was 40 minutes and more than 50% of face to face time was spent in counseling and/or coordination of care regarding the diagnosis and recommendations above    Dora Troy MD  Medical Spine Specialist  [facility]

## 2019-08-21 ENCOUNTER — TELEPHONE (OUTPATIENT)
Dept: PALLIATIVE MEDICINE | Facility: CLINIC | Age: 29
End: 2019-08-21

## 2019-08-21 NOTE — TELEPHONE ENCOUNTER
Received form from patient requesting workplace modification for a sit to stand desk. I am unable to answer the required questions regarding her job requirements and job description. I have only seen her for one visit. She can either schedule a follow up with me to review these forms or have a provider who knows more about her job history fill out these forms.    Dora Troy MD  [facility]

## 2019-08-26 DIAGNOSIS — M54.16 LUMBAR RADICULOPATHY: ICD-10-CM

## 2019-08-26 NOTE — TELEPHONE ENCOUNTER
Received fax request from [facility] pharmacy requesting refill(s) for gabapentin (NEURONTIN) 300 MG capsule    Last refilled on 07/09/19    Pt last seen on 07/09/19  Next appt scheduled for NONE    Will facilitate refill.       Elsy Shelley    [facility]

## 2019-08-26 NOTE — TELEPHONE ENCOUNTER
Can you call patient and gather more information on current dose of if it is helpful?    Dora Troy MD  [facility]

## 2019-08-27 RX ORDER — GABAPENTIN 300 MG/1
600 CAPSULE ORAL 3 TIMES DAILY
Qty: 180 CAPSULE | Refills: 0 | Status: SHIPPED | OUTPATIENT
Start: 2019-08-27 | End: 2019-10-14

## 2019-08-27 NOTE — TELEPHONE ENCOUNTER
Signed Prescriptions:                        Disp   Refills    gabapentin (NEURONTIN) 300 MG capsule      180 ca*0        Sig: Take 2 capsules (600 mg) by mouth 3 times daily  Authorizing Provider: MICHELLE KINSEY MD  [facility]

## 2019-08-27 NOTE — TELEPHONE ENCOUNTER
Left message to patient to contact clinic to clarify the dosage she is currently taking.       Radha Patel [facility]  [place]

## 2019-08-27 NOTE — TELEPHONE ENCOUNTER
Called patient. She states that she would like to be seen for follow up to discuss her paperwork as she really does not follow anyone in her PCP office.   Scheduled 09/11/19  Routing FYI to provider, ok to close when reviewed.     Elyse CINTRONN, RN Care Coordinator  [facility]

## 2019-08-27 NOTE — TELEPHONE ENCOUNTER
Patient called back, and spoke with writer.    Patient stated that she is taking 2 capsules three times a day, like it says on the prescription bottle.       Radha Patel [facility]  [place]

## 2019-09-08 DIAGNOSIS — M54.42 ACUTE BILATERAL LOW BACK PAIN WITH LEFT-SIDED SCIATICA: ICD-10-CM

## 2019-09-09 ENCOUNTER — MYC REFILL (OUTPATIENT)
Dept: FAMILY MEDICINE | Facility: CLINIC | Age: 29
End: 2019-09-09

## 2019-09-09 DIAGNOSIS — F41.1 GAD (GENERALIZED ANXIETY DISORDER): ICD-10-CM

## 2019-09-09 DIAGNOSIS — M54.42 ACUTE BILATERAL LOW BACK PAIN WITH LEFT-SIDED SCIATICA: ICD-10-CM

## 2019-09-09 NOTE — TELEPHONE ENCOUNTER
Controlled Substance Refill Request for       cyclobenzaprine (FLEXERIL) 10 MG tablet  Problem List Complete:  No     PROVIDER TO CONSIDER COMPLETION OF PROBLEM LIST AND OVERVIEW/CONTROLLED SUBSTANCE AGREEMENT    Last Written Prescription Date:  7/1/2019  Last Fill Quantity: 30tablet,   # refills: 1    THE MOST RECENT OFFICE VISIT MUST BE WITHIN THE PAST 3 MONTHS. AT LEAST ONE FACE TO FACE VISIT MUST OCCUR EVERY 6 MONTHS. ADDITIONAL VISITS CAN BE VIRTUAL.  (THIS STATEMENT SHOULD BE DELETED.)    Last Office Visit with [facility] primary care provider: 4/2/2019, Pepper    Future Office visit:     Controlled substance agreement:   Encounter-Level CSA:    There are no encounter-level csa.     Patient-Level CSA:    There are no patient-level csa.         Last Urine Drug Screen: No results found for: CDAUT, No results found for: COMDAT, No results found for: THC13, PCP13, COC13, MAMP13, OPI13, AMP13, BZO13, TCA13, MTD13, BAR13, OXY13, PPX13, BUP13     Processing:  Staff will hand deliver Rx to on-site pharmacy     [email or web address]       checked in past 3 months?  No, route to RN

## 2019-09-09 NOTE — TELEPHONE ENCOUNTER
Routing refill request to provider for review/approval because:  Drug not on the [facility] refill protocol     Sherlyn Hewitt RN, BS  Clinical Nurse Triage.

## 2019-09-09 NOTE — TELEPHONE ENCOUNTER
Controlled Substance Refill Request for cyclobenzaprine (FLEXERIL) 10 MG tablet  Problem List Complete:  No     PROVIDER TO CONSIDER COMPLETION OF PROBLEM LIST AND OVERVIEW/CONTROLLED SUBSTANCE AGREEMENT    Last Written Prescription Date:  7/1/2019  Last Fill Quantity: 30 tablet,   # refills: 1    THE MOST RECENT OFFICE VISIT MUST BE WITHIN THE PAST 3 MONTHS. AT LEAST ONE FACE TO FACE VISIT MUST OCCUR EVERY 6 MONTHS. ADDITIONAL VISITS CAN BE VIRTUAL.  (THIS STATEMENT SHOULD BE DELETED.)    Last Office Visit with [facility] primary care provider: 4/2/2019, Pepper    Future Office visit:     Controlled substance agreement:   Encounter-Level CSA:    There are no encounter-level csa.     Patient-Level CSA:    There are no patient-level csa.         Last Urine Drug Screen: No results found for: CDAUT, No results found for: COMDAT, No results found for: THC13, PCP13, COC13, MAMP13, OPI13, AMP13, BZO13, TCA13, MTD13, BAR13, OXY13, PPX13, BUP13     Processing:  Staff will hand deliver Rx to on-site pharmacy     [email or web address]       checked in past 3 months?  No, route to RN

## 2019-09-09 NOTE — TELEPHONE ENCOUNTER
Dr. Castillo -  : last filled by you 2/13/19 for 10 days. Filled x2 by Radha Tompkins for 0.5mg on 3/15/19 and 4/25/19. Gabapentin 300mg started by Dora Troy and filled 7/9/19 and 8/27/19.  Last OV: 4/2/19 w/ Bolivar Pabon. Last OV w/ you 2/8/19.    Routing refill request to provider for review/approval because:  Drug not on the [facility] refill protocol     Malaika Monterroso, MSN, RN, PHN

## 2019-09-09 NOTE — TELEPHONE ENCOUNTER
Controlled Substance Refill Request for ALPRAZolam (XANAX) 1 MG tablet  Problem List Complete:  No     PROVIDER TO CONSIDER COMPLETION OF PROBLEM LIST AND OVERVIEW/CONTROLLED SUBSTANCE AGREEMENT    Last Written Prescription Date:  2/8/2019  Last Fill Quantity: 30 tablet,   # refills: 0    THE MOST RECENT OFFICE VISIT MUST BE WITHIN THE PAST 3 MONTHS. AT LEAST ONE FACE TO FACE VISIT MUST OCCUR EVERY 6 MONTHS. ADDITIONAL VISITS CAN BE VIRTUAL.  (THIS STATEMENT SHOULD BE DELETED.)    Last Office Visit with [facility] [place]: 4/2/2019, Pepper    Future Office visit:     Controlled substance agreement:   Encounter-Level CSA:    There are no encounter-level csa.     Patient-Level CSA:    There are no patient-level csa.         Last Urine Drug Screen: No results found for: CDAUT, No results found for: COMDAT, No results found for: THC13, PCP13, COC13, MAMP13, OPI13, AMP13, BZO13, TCA13, MTD13, BAR13, OXY13, PPX13, BUP13     Processing:  Staff will hand deliver Rx to on-site pharmacy     [email or web address]       checked in past 3 months?  No, route to RN

## 2019-09-10 RX ORDER — CYCLOBENZAPRINE HCL 10 MG
TABLET ORAL
Qty: 30 TABLET | Refills: 1 | Status: SHIPPED | OUTPATIENT
Start: 2019-09-10 | End: 2020-12-15

## 2019-09-10 RX ORDER — ALPRAZOLAM 1 MG
1 TABLET ORAL 3 TIMES DAILY PRN
Qty: 30 TABLET | Refills: 0 | OUTPATIENT
Start: 2019-09-10

## 2019-09-10 RX ORDER — CYCLOBENZAPRINE HCL 10 MG
10 TABLET ORAL 3 TIMES DAILY PRN
Qty: 30 TABLET | Refills: 0 | Status: SHIPPED | OUTPATIENT
Start: 2019-09-10 | End: 2020-01-13

## 2019-09-10 NOTE — TELEPHONE ENCOUNTER
Declined. I don't think I see this patient anymore.  Vandana Castillo MD  [facility]  [phone]  r

## 2019-09-12 NOTE — PROGRESS NOTES
[facility]    Date of visit: 9/13/2019    Chief complaint:   Chief Complaint   Patient presents with     Pain     Interval history:  Fiona Henry is a 29 year old female last seen by me on 7/9/2019.      Recommendations/plan at the last visit included:  1. Education: The patient was educated as to the natural history of their disorder. Reassurance was given and the patient was encouraged to engage in activity and movement as able.  2. Physical Therapy:  Discussed that she should start PT soon. She has an external location that she is planning on going to.   3. Diagnostic Studies:  No new imaging needed.  4. Medication Management:    1. Start gabapentin 300 mg at night and titrate up to 600 mg TID if well tolerated.   5. Further procedures recommended: Discussed a right L4-5 Transforaminal epidural steroid injection. She wants to try more conservative measures first before considering an BABITA.   6. Complementary treatments: she is interested in trying acupuncture. Order placed today.   7. Other: Continue using TENS unit to see if it provides some benefit  8. Follow up: She will call if interested in trying as BABITA. Otherwise she will follow up with me in clinic in 6-8 weeks.    Since her last visit, Fiona Henry reports:  -her pain is little better than it was at last visit.   - Gabapentin has been helpful with taking the edge off the pain  - She has been using a back brace intermittently which is helpful  - No new weakness, lower extremity weakness, bowel/bladder changes  - She is here today mainly to review forms for her to be able to get a sit to stand desk at work. Her job requires working at a desk for 8 hours per day. Sitting too long has been painful for her. Being able to transition from sitting to standing as needed while continuing to work would be helpful.     Pain Information:   Pain quality: Aching, Exhausting, Gnawing, Numb, Tender and Tiring    Pain timing: Worse in evenings  and night     Pain rating: intensity ranges from 3/10 to 6/10, and averages 4/10 on a 0-10 scale. Currently, 5/10.    Aggravating factors include: staying still, lifting anything   Relieving factors include: stretching, yoga    Current pain treatments:   Flexeril 10 mg daily - takes at night - [facility]  Aleve prn - [facility]  Tiger balm - [facility]  Gabapentin - 600 mg TID - [facility]    Current MME: 0    Review of [place] Prescription Monitoring Program (): No concern for abuse or misuse of controlled medications based on this report.     Annual Controlled Substance Agreement/UDS due date: NA    Past pain treatments:  Lidocaine patches     Other treatments have included:  Behavioral interventions: None  PT: No   Manual Medicine: yes - goes sporadically - [facility]  Surgeries: none  Acupuncture: yes - [facility]  TENs Unit: Yes - [facility]  Injections: None    Medications:  Current Outpatient Medications   Medication Sig Dispense Refill     ALPRAZolam (XANAX) 1 MG tablet Take 1 tablet (1 mg) by mouth 3 times daily as needed for anxiety 30 tablet 0     cyclobenzaprine (FLEXERIL) 10 MG tablet TAKE 1 TABLET BY MOUTH THREE TIMES DAILY AS NEEDED FOR MUSCLE SPASM 30 tablet 1     cyclobenzaprine (FLEXERIL) 10 MG tablet Take 1 tablet (10 mg) by mouth 3 times daily as needed for muscle spasms 30 tablet 0     gabapentin (NEURONTIN) 300 MG capsule Take 2 capsules (600 mg) by mouth 3 times daily 180 capsule 0     levothyroxine (SYNTHROID/LEVOTHROID) 25 MCG tablet Take 25 mcg by mouth daily       PARoxetine (PAXIL) 20 MG tablet Take 1 tablet (20 mg) by mouth every morning (Patient not taking: Reported on 7/9/2019) 90 tablet 0     VENLAFAXINE HCL PO          Medical History: any changes in medical history since they were last seen? No    Review of Systems:  The 14 system ROS was reviewed from the intake questionnaire, and is positive for: weight gain, numbness/tingling.  Any bowel or bladder problems: none  Mood: ok    Physical Exam:  Blood pressure 110/72, pulse 86,  SpO2 99 %.  General: A&O x 3, in no distress, pleasant and cooperative  Gait: Normal  Neuro: strength 5/5/ in BLE    Imaging:  Lumbar MRI completed on 6/28/2019 showed:  FINDINGS: There are five lumbar-type vertebral bodies assumed for the  purposes of this dictation. The distal spinal cord and cauda equina  appear normal.      Alignment of the lumbar spine is normal. No loss of vertebral body  height. There are no destructive osseous lesions. Mild loss of  intervertebral disc height with disc desiccation and Modic type  degenerative endplate changes at L4-L5. Mild STIR hyperintense  endplate edema at L4-L5.      Level by level as follows:      T12-L1: No significant disc herniation. No spinal canal or neural  foraminal narrowing.      L1-L2: No significant disc herniation. No spinal canal or neural  foraminal narrowing.      L2-L3: No significant disc herniation. No spinal canal or neural  foraminal narrowing.      L3-L4: No significant disc herniation. No spinal canal or neural  foraminal narrowing.      L4-L5: Central disc protrusion slightly asymmetric towards the right.  This abuts and may slightly compress the traversing right L5 nerve  root in the lateral recess. No significant spinal canal or neural  foraminal narrowing.      L5-S1: No significant disc herniation. No spinal canal or neural  foraminal narrowing.      Paraspinous soft tissues: Normal.         IMPRESSION: Single level degenerative changes with central disc  protrusion at L4-L5 asymmetric towards the right which may slightly  compress the traversing right S1 nerve root in the lateral recess.  Other disc levels appear normal. Otherwise normal MRI of the lumbar  spine.    Assessment:   Fiona Henry is a 29 year old female with a past medical history significant for hypothyroidism, ASIA, depression  who presents with complaints of back and right leg pain.      1. Back pain: Fiona has a history of chronic low back pain with intermittent flare  ups over the years. More recently in March of this year she developed new symptoms of back pain radiating into the right lower extremity and continues to have those symptoms now. Leg symptoms are more bothersome than the low back. Neuro exam is normal. No red flag symptoms. MRI of lumbar spine shows a disc protrusion at L4-5 which is asymmetric towards the right and slightly compresses or abuts the traversing right L5 nerve root in the lateral recess. Etiology of symptoms is consistent with lumbar radiculitis/radiculopathy.     Plan:  1. Physical Therapy: Continue to work on core strengthening.  2. Diagnostic Studies:  No new imaging needed.  3. Medication Management:    1. Continue Gabapentin 600 mg TID.   4. Further procedures recommended: Consider right L4-5 Transforaminal epidural steroid injection. She wants to try more conservative measures first before considering an BABITA.   5. Other: Form filled out today for a sit to stand desk.   6. Follow up: Fiona can f/u with me if interested in a BABITA.       I spent 30 minutes of time face to face with the patient.  Greater than 50% of this time was spent in patient counseling and/or coordination of care.    Dora Troy MD  [facility]

## 2019-09-13 ENCOUNTER — OFFICE VISIT (OUTPATIENT)
Dept: PALLIATIVE MEDICINE | Facility: CLINIC | Age: 29
End: 2019-09-13
Payer: COMMERCIAL

## 2019-09-13 VITALS — SYSTOLIC BLOOD PRESSURE: 110 MMHG | OXYGEN SATURATION: 99 % | DIASTOLIC BLOOD PRESSURE: 72 MMHG | HEART RATE: 86 BPM

## 2019-09-13 DIAGNOSIS — M54.16 LUMBAR RADICULOPATHY: Primary | ICD-10-CM

## 2019-09-13 PROCEDURE — 99214 OFFICE O/P EST MOD 30 MIN: CPT | Performed by: PHYSICAL MEDICINE & REHABILITATION

## 2019-09-13 RX ORDER — VENLAFAXINE HYDROCHLORIDE 150 MG/1
CAPSULE, EXTENDED RELEASE ORAL
COMMUNITY
Start: 2019-04-25 | End: 2022-05-11

## 2019-09-13 ASSESSMENT — PAIN SCALES - GENERAL: PAINLEVEL: MODERATE PAIN (5)

## 2019-09-13 NOTE — PATIENT INSTRUCTIONS
----------------------------------------------------------------  [facility] Number:  [phone]     Call with any questions about your care and for scheduling assistance.     Calls are returned Monday through Friday between 8 AM and 4:30 PM. We usually get back to you within 2 business days depending on the issue/request.    If we are prescribing your medications:    For opioid medication refills, call the clinic or send a Loandesk message 7 days in advance.  Please include:    Name of requested medication    Name of the pharmacy.    For non-opioid medications, call your pharmacy directly to request a refill. Please allow 3-4 days to be processed.     Per [place] State Law:    All controlled substance prescriptions must be filled within 30 days of being written.      For those controlled substances allowing refills, pickup must occur within 30 days of last fill.      We believe regular attendance is key to your success in our program!      Any time you are unable to keep your appointment we ask that you call us at least 24 hours in advance to cancel.This will allow us to offer the appointment time to another patient.     Multiple missed appointments may lead to dismissal from the clinic.

## 2019-10-14 DIAGNOSIS — M54.16 LUMBAR RADICULOPATHY: ICD-10-CM

## 2019-10-14 RX ORDER — GABAPENTIN 300 MG/1
600 CAPSULE ORAL 3 TIMES DAILY
Qty: 180 CAPSULE | Refills: 0 | Status: SHIPPED | OUTPATIENT
Start: 2019-10-14 | End: 2020-12-07

## 2019-10-14 NOTE — TELEPHONE ENCOUNTER
Signed Prescriptions:                        Disp   Refills    gabapentin (NEURONTIN) 300 MG capsule      180 ca*0        Sig: Take 2 capsules (600 mg) by mouth 3 times daily  Authorizing Provider: MICHELLE KINSEY MD  [facility]

## 2019-10-14 NOTE — TELEPHONE ENCOUNTER
Received fax request from   [facility] [address] [place] [address]  Phone: [phone] Fax: [phone]     pharmacy requesting refill(s) for Gabapentin 300mg    Last refilled on 08/27/19    Pt last seen on 09/13/19  Next appt scheduled for NONE    Will facilitate refill.      Liang Bob, [facility]  October 14, 2019

## 2020-01-13 DIAGNOSIS — M54.42 ACUTE BILATERAL LOW BACK PAIN WITH LEFT-SIDED SCIATICA: ICD-10-CM

## 2020-01-13 RX ORDER — CYCLOBENZAPRINE HCL 10 MG
TABLET ORAL
Qty: 30 TABLET | Refills: 0 | Status: SHIPPED | OUTPATIENT
Start: 2020-01-13 | End: 2020-03-02

## 2020-01-13 NOTE — TELEPHONE ENCOUNTER
Last Written Prescription Date:  9.10.19  Last Fill Quantity: 30,  # refills: 1   Last office visit: 4/2/2019 with prescribing provider:  Pepper Has seen pain clinic couple times since OV   Future Office Visit:      Routing refill request to provider for review/approval because:  Drug not on the [facility] refill protocol

## 2020-03-02 DIAGNOSIS — M54.42 ACUTE BILATERAL LOW BACK PAIN WITH LEFT-SIDED SCIATICA: ICD-10-CM

## 2020-03-02 RX ORDER — CYCLOBENZAPRINE HCL 10 MG
TABLET ORAL
Qty: 30 TABLET | Refills: 0 | Status: SHIPPED | OUTPATIENT
Start: 2020-03-02 | End: 2020-06-04

## 2020-03-02 NOTE — TELEPHONE ENCOUNTER
Flexaril  Last OV 4/2/19 [place] [facility] 2/8/19 Dr. Castillo  Last RF 1/13/20 #30    No upcoming appt.  Not [facility] med.  Sent to provider.  Please advise.  Radha Franklin RN

## 2020-03-11 ENCOUNTER — HEALTH MAINTENANCE LETTER (OUTPATIENT)
Age: 30
End: 2020-03-11

## 2020-06-04 DIAGNOSIS — M54.42 ACUTE BILATERAL LOW BACK PAIN WITH LEFT-SIDED SCIATICA: ICD-10-CM

## 2020-06-04 RX ORDER — CYCLOBENZAPRINE HCL 10 MG
TABLET ORAL
Qty: 30 TABLET | Refills: 0 | Status: SHIPPED | OUTPATIENT
Start: 2020-06-04 | End: 2020-08-27

## 2020-08-27 DIAGNOSIS — M54.42 ACUTE BILATERAL LOW BACK PAIN WITH LEFT-SIDED SCIATICA: ICD-10-CM

## 2020-08-27 RX ORDER — CYCLOBENZAPRINE HCL 10 MG
TABLET ORAL
Qty: 30 TABLET | Refills: 0 | Status: SHIPPED | OUTPATIENT
Start: 2020-08-27 | End: 2020-12-07

## 2020-08-27 NOTE — TELEPHONE ENCOUNTER
Routing refill request to provider for review/approval because:  Drug not on the [facility] refill protocol     Radha Franklin RN

## 2020-12-07 ENCOUNTER — E-VISIT (OUTPATIENT)
Dept: FAMILY MEDICINE | Facility: CLINIC | Age: 30
End: 2020-12-07
Payer: COMMERCIAL

## 2020-12-07 DIAGNOSIS — K64.4 EXTERNAL HEMORRHOIDS: Primary | ICD-10-CM

## 2020-12-07 PROCEDURE — 99421 OL DIG E/M SVC 5-10 MIN: CPT | Performed by: FAMILY MEDICINE

## 2020-12-07 RX ORDER — DOCUSATE SODIUM 100 MG/1
100 CAPSULE, LIQUID FILLED ORAL 2 TIMES DAILY
Qty: 60 CAPSULE | Refills: 0 | Status: SHIPPED | OUTPATIENT
Start: 2020-12-07 | End: 2022-05-11

## 2020-12-07 RX ORDER — LIDOCAINE 40 MG/G
CREAM TOPICAL 4 TIMES DAILY
Qty: 45 G | Refills: 3 | Status: SHIPPED | OUTPATIENT
Start: 2020-12-07 | End: 2020-12-08

## 2020-12-07 NOTE — PATIENT INSTRUCTIONS
Jordana Jaimes:  It sounds like you have been doing the right things for hemorrhoids, but it sounds like it is not helping.  Please continue on using Anusol, and I will send a prescription for stool softener that you take twice daily.  Also if the hemorrhoids is not getting any better with the medicine, I will send you to the colorectal surgery to see if there is other things that they can do.,  So at that time, just let me know if symptoms persist by Friday.  Vandana Castillo MD  [facility]  [phone]    Thank you for choosing us for your care. I have placed an order for a prescription so that you can start treatment. View your full visit summary for details by clicking on the link below. Your pharmacist will able to address any questions you may have about the medication.     If you're not feeling better within 5-7 days, please schedule an appointment.  You can schedule an appointment right here in [facility], or call [phone]  If the visit is for the same symptoms as your e-visit, we'll refund the cost of your e-visit if seen within seven days.

## 2020-12-07 NOTE — TELEPHONE ENCOUNTER
Provider E-Visit time total (minutes): 6 minutes.  Treatment for external hemorrhoids with Anusol and colace.  If no improvement, refer to Colorectal.

## 2020-12-08 ENCOUNTER — MYC MEDICAL ADVICE (OUTPATIENT)
Dept: FAMILY MEDICINE | Facility: CLINIC | Age: 30
End: 2020-12-08

## 2020-12-08 DIAGNOSIS — K64.4 EXTERNAL HEMORRHOIDS: Primary | ICD-10-CM

## 2020-12-08 NOTE — TELEPHONE ENCOUNTER
[facility]on [facility] Rectal system is down and they are unable to schedule at this time.      A provider should be calling you soon.    Barbara-Referrals

## 2020-12-08 NOTE — TELEPHONE ENCOUNTER
Jeff Jimenez, can you please call Fiona, and tell her that their system is down today and they cannot schedule anyone, but she can call tomorrow morning and schedule an emergent appointment  [phone]

## 2020-12-10 ENCOUNTER — TRANSFERRED RECORDS (OUTPATIENT)
Dept: HEALTH INFORMATION MANAGEMENT | Facility: CLINIC | Age: 30
End: 2020-12-10

## 2020-12-14 DIAGNOSIS — M54.42 ACUTE BILATERAL LOW BACK PAIN WITH LEFT-SIDED SCIATICA: ICD-10-CM

## 2020-12-14 NOTE — TELEPHONE ENCOUNTER
Routing refill request to provider for review/approval because:  Drug not on the [facility] refill protocol     Camryn Keene RN   [facility] -- Triage Nurse

## 2020-12-15 RX ORDER — CYCLOBENZAPRINE HCL 10 MG
TABLET ORAL
Qty: 30 TABLET | Refills: 0 | Status: SHIPPED | OUTPATIENT
Start: 2020-12-15 | End: 2024-03-01

## 2021-01-03 ENCOUNTER — HEALTH MAINTENANCE LETTER (OUTPATIENT)
Age: 31
End: 2021-01-03

## 2021-02-04 ENCOUNTER — VIRTUAL VISIT (OUTPATIENT)
Dept: INTERNAL MEDICINE | Facility: CLINIC | Age: 31
End: 2021-02-04
Payer: COMMERCIAL

## 2021-02-04 VITALS — WEIGHT: 170 LBS | BODY MASS INDEX: 30.11 KG/M2

## 2021-02-04 DIAGNOSIS — M79.661 RIGHT CALF PAIN: Primary | ICD-10-CM

## 2021-02-04 DIAGNOSIS — E03.9 ACQUIRED HYPOTHYROIDISM: ICD-10-CM

## 2021-02-04 PROCEDURE — 99214 OFFICE O/P EST MOD 30 MIN: CPT | Mod: 95 | Performed by: INTERNAL MEDICINE

## 2021-02-04 RX ORDER — LEVOTHYROXINE SODIUM 25 UG/1
25 TABLET ORAL DAILY
Status: CANCELLED | OUTPATIENT
Start: 2021-02-04

## 2021-02-04 ASSESSMENT — ENCOUNTER SYMPTOMS
BACK PAIN: 1
FEVER: 0
ARTHRALGIAS: 1
CHILLS: 0

## 2021-02-04 NOTE — PATIENT INSTRUCTIONS
Patient Education     Hypothyroidism    You have hypothyroidism. This means your thyroid gland is not making enough thyroid hormone. This hormone is vital to body growth and metabolism. If you don t make enough, many body processes slow down. This can cause symptoms throughout the body. Hypothyroidism can range from mild to severe. The most severe form is called myxedema.  There are a number of causes of hypothyroidism. A common cause is Hashimoto s disease. This disease causes the body s own immune system to attack the thyroid gland. When you have certain treatments, such as surgery to remove the thyroid gland, this can also cause hypothyroidism. Sometimes the thyroid gland is not functioning because of lack of stimulation from the pituitary gland.  Symptoms of hypothyroidism can include:    Fatigue    Trouble concentrating or thinking clearly; forgetfulness    Dry skin    Hair loss    Weight gain    Low tolerance to cold    Constipation    Depression    Personality changes    Tingling or prickling of the hands or feet    Heavy, absent, or irregular periods (women only)  Older adults may sometimes have other symptoms. These can include:    Muscle aches and weakness    Confusion    Incontinence (unable to control urine or stool)    Trouble moving around    Falling  Treatment for hypothyroidism involves taking thyroid hormone pills daily. These pills replace the hormone your thyroid doesn t make. You will likely need to take a daily pill for the rest of your life. Tips for taking this medicine are given below.  Home care  Tips for taking your medicine    Take your thyroid hormone pills as prescribed by your healthcare provider. This is most often 1 pill a day on an empty stomach. Use a pillbox labeled with the days of the week. This will help you remember to take your pill each day.    Don t take products that contain iron and calcium or antacids within 4 hours of taking your thyroid hormone pills.    Don t take  other medicines with your thyroid hormone pill without checking with your provider first.    Tell your provider if you have any side effects from your medicines that bother you, especially any chest pain or irregular heartbeats.    Never change the dosage or stop taking your thyroid pills without talking to your provider first.  General care    Always talk with your provider before trying other medicines or treatments for your thyroid problem.    If you see other healthcare providers, be sure to let them know about your thyroid problem.    Let your healthcare provider know if you become pregnant because your dose of thyroid hormone will need to be adjusted.  Follow-up care  See your healthcare provider for checkups as advised. You may need regular tests to check the level of thyroid hormone in your blood.  When to seek medical advice  Call your healthcare provider right away if any of these occur:    New symptoms develop    Symptoms return, continue, or worsen even after treatment    Extreme fatigue    Puffy hands, face, or feet    Fast or irregular heartbeat    Confusion  Call 911  Call 911 if any of these occur:    Fainting    Chest pain    Shortness of breath or trouble breathing  [employer] last reviewed this educational content on 4/1/2018 2000-2020 [employer] [address] [place] [address]. All rights reserved. This information is not intended as a substitute for professional medical care. Always follow your healthcare professional's instructions.

## 2021-02-04 NOTE — PROGRESS NOTES
Fiona is a 30 year old who is being evaluated via a billable video visit.      How would you like to obtain your AVS? MyChart  If the video visit is dropped, the invitation should be resent by: Text to cell phone: [phone]  Will anyone else be joining your video visit? No    Video Start Time: 2:08 PM  Assessment & Plan   Problem List Items Addressed This Visit     None      Visit Diagnoses     Right calf pain    -  Primary    Relevant Orders    US Lower Extremity Venous Duplex Right    **TSH with free T4 reflex FUTURE anytime    **CBC with platelets FUTURE anytime    **Comprehensive metabolic panel FUTURE anytime    Magnesium    UA reflex to Microscopic    Acquired hypothyroidism        Relevant Orders    **TSH with free T4 reflex FUTURE anytime         Labs ordered to check thyroid, electrolytes. Also doppler ordered to r/o DVT.  Will restart levothyroxine after checking tsh.  Advised clinic visit if symptoms doesn't improve.    No follow-ups on file.    Cayden Cruz MD  [facility]     Fiona is a 30 year old who presents to clinic today for the following health issues       HPI   She has pain in her right calf for a month and a half. Now her calf is starting to bruise.    C/o right calf cramps for few months. It also started bruising yesterday. She denies swelling in calf region. She does bruise easily.  She has been using Accupressure instrument in that area before bruising started. Also tried TENs units.   She doesn't have varicose veins.  She is not on birth control pills. She doesn't smoke.  Denies knee pain.  Denies long distance driving, air travel.  Hot bath helps minimally.  Known hypothyroid. Ran out of  levothyroxine.  Last tsh from 2019 February is 0.53  She also has chronic low back pain but not seeing anyone.    Review of Systems   Constitutional: Negative for chills and fever.   Musculoskeletal: Positive for arthralgias and back pain. Negative for  "gait problem.          Objective       Vitals:  No vitals were obtained today due to virtual visit.    Physical Exam   \"GENERAL: Healthy, alert and no distress\",\"EYES: Eyes grossly normal to inspection.  No discharge or erythema, or obvious scleral/conjunctival abnormalities.\",\"RESP: No audible wheeze, cough, or visible cyanosis.  No visible retractions or increased work of breathing.  \",\"SKIN: Visible skin clear. No significant rash, abnormal pigmentation or lesions.\"  Right calf: no obvious swelling noted, patchy bruising noted.  No varicose veins noted      Video-Visit Details    Type of service:  Video Visit    Video End Time:2:19 PM    Originating Location (pt. Location): Home    Distant Location (provider location):  HOME    Platform used for Video Visit: Timothy  "

## 2021-03-30 ENCOUNTER — TRANSFERRED RECORDS (OUTPATIENT)
Dept: HEALTH INFORMATION MANAGEMENT | Facility: CLINIC | Age: 31
End: 2021-03-30

## 2021-04-25 ENCOUNTER — HEALTH MAINTENANCE LETTER (OUTPATIENT)
Age: 31
End: 2021-04-25

## 2021-05-03 ENCOUNTER — TRANSFERRED RECORDS (OUTPATIENT)
Dept: HEALTH INFORMATION MANAGEMENT | Facility: CLINIC | Age: 31
End: 2021-05-03

## 2021-09-24 ENCOUNTER — OFFICE VISIT (OUTPATIENT)
Dept: URGENT CARE | Facility: URGENT CARE | Age: 31
End: 2021-09-24
Payer: COMMERCIAL

## 2021-09-24 ENCOUNTER — NURSE TRIAGE (OUTPATIENT)
Dept: NURSING | Facility: CLINIC | Age: 31
End: 2021-09-24

## 2021-09-24 VITALS
BODY MASS INDEX: 27.46 KG/M2 | DIASTOLIC BLOOD PRESSURE: 71 MMHG | TEMPERATURE: 98.2 F | WEIGHT: 155 LBS | HEART RATE: 89 BPM | SYSTOLIC BLOOD PRESSURE: 105 MMHG

## 2021-09-24 DIAGNOSIS — M54.50 ACUTE LEFT-SIDED LOW BACK PAIN WITHOUT SCIATICA: Primary | ICD-10-CM

## 2021-09-24 DIAGNOSIS — R30.0 DYSURIA: ICD-10-CM

## 2021-09-24 DIAGNOSIS — M62.830 BACK MUSCLE SPASM: ICD-10-CM

## 2021-09-24 LAB
ALBUMIN UR-MCNC: NEGATIVE MG/DL
APPEARANCE UR: CLEAR
BILIRUB UR QL STRIP: NEGATIVE
COLOR UR AUTO: YELLOW
GLUCOSE UR STRIP-MCNC: NEGATIVE MG/DL
HGB UR QL STRIP: NEGATIVE
KETONES UR STRIP-MCNC: NEGATIVE MG/DL
LEUKOCYTE ESTERASE UR QL STRIP: NEGATIVE
NITRATE UR QL: NEGATIVE
PH UR STRIP: 6.5 [PH] (ref 5–7)
SP GR UR STRIP: 1.01 (ref 1–1.03)
UROBILINOGEN UR STRIP-ACNC: 0.2 E.U./DL

## 2021-09-24 PROCEDURE — 81003 URINALYSIS AUTO W/O SCOPE: CPT | Performed by: FAMILY MEDICINE

## 2021-09-24 PROCEDURE — 96372 THER/PROPH/DIAG INJ SC/IM: CPT | Performed by: FAMILY MEDICINE

## 2021-09-24 PROCEDURE — 99214 OFFICE O/P EST MOD 30 MIN: CPT | Mod: 25 | Performed by: FAMILY MEDICINE

## 2021-09-24 RX ORDER — HYDROCODONE BITARTRATE AND ACETAMINOPHEN 5; 325 MG/1; MG/1
1 TABLET ORAL EVERY 6 HOURS PRN
Qty: 12 TABLET | Refills: 0 | Status: SHIPPED | OUTPATIENT
Start: 2021-09-24 | End: 2021-09-27

## 2021-09-24 RX ORDER — KETOROLAC TROMETHAMINE 30 MG/ML
60 INJECTION, SOLUTION INTRAMUSCULAR; INTRAVENOUS ONCE
Status: COMPLETED | OUTPATIENT
Start: 2021-09-24 | End: 2021-09-24

## 2021-09-24 RX ADMIN — KETOROLAC TROMETHAMINE 60 MG: 30 INJECTION, SOLUTION INTRAMUSCULAR; INTRAVENOUS at 17:46

## 2021-09-24 NOTE — PROGRESS NOTES
SUBJECTIVE: Fiona Henry is a 31 year old female presenting with a chief complaint of left sided low back pain without radicular pain.  Onset of symptoms was 2 day(s) ago.  Hx of lumbar disc herniation    No past medical history on file.  No Known Allergies  Social History     Tobacco Use     Smoking status: Never Smoker     Smokeless tobacco: Never Used   Substance Use Topics     Alcohol use: Yes     Comment: Rarely       ROS:  SKIN: no rash  GI: no vomiting    OBJECTIVE:  /71 (BP Location: Right arm, Patient Position: Sitting, Cuff Size: Adult Regular)   Pulse 89   Temp 98.2  F (36.8  C) (Oral)   Wt 70.3 kg (155 lb)   BMI 27.46 kg/m  GENERAL APPEARANCE: healthy, alert and no distress  NEURO: Normal strength and tone, sensory exam grossly normal,  normal speech and mentation  SKIN: no suspicious lesions or rashes  Pain with rom  Negative slr      ICD-10-CM    1. Acute left-sided low back pain without sciatica  M54.5 ketorolac (TORADOL) injection 60 mg     HYDROcodone-acetaminophen (NORCO) 5-325 MG tablet   2. Back muscle spasm  M62.830 ketorolac (TORADOL) injection 60 mg     HYDROcodone-acetaminophen (NORCO) 5-325 MG tablet   3. Dysuria  R30.0 UA macro with reflex to Microscopic and Culture - Clinc Collect     ketorolac (TORADOL) injection 60 mg     Pt has muscle relaxors and rx for steroids which she will  and start  Fluids/Rest, f/u if worse/not any better

## 2021-09-24 NOTE — TELEPHONE ENCOUNTER
Having back spasm since Wednesday morning and has been getting consistently worse.     In so much pain that she is having trouble getting up.    Pain rate: 8/10    Hx of herniated disk in back    Patient was verbally moaning in pain at times during call.     Per Protocol - See in Office today, will be going to [facility] - talked about ED, but was worried about cost.     driving pt to [facility] now    Nia Cruz RN  [facility] Nurse Advisor  2:04 PM 9/24/2021     _____________________________________    COVID 19 Nurse Triage Plan/Patient Instructions    Please be aware that novel coronavirus (COVID-19) may be circulating in the community. If you develop symptoms such as fever, cough, or SOB or if you have concerns about the presence of another infection including coronavirus (COVID-19), please contact your health care provider or visit [email or web address].     Disposition/Instructions    In-Person Visit with provider recommended. Reference Visit Selection Guide.    Thank you for taking steps to prevent the spread of this virus.  o Limit your contact with others.  o Wear a simple mask to cover your cough.  o Wash your hands well and often.    Resources    [facility]: About COVID-19: [email or web address]    CDC: What to Do If You're Sick: www.cdc.gov/coronavirus/2019-ncov/about/steps-when-sick.html    CDC: Ending Home Isolation: www.cdc.gov/coronavirus/2019-ncov/hcp/disposition-in-home-patients.html     CDC: Caring for Someone: www.cdc.gov/coronavirus/2019-ncov/if-you-are-sick/care-for-someone.html     [facility]: Interim Guidance for Hospital Discharge to Home: www.health.[facility].[place]/diseases/coronavirus/hcp/hospdischarge.pdf    [facility] clinical trials (COVID-19 research studies): [email or web address]-clinical-trials     Below are the COVID-19 hotlines at the [facility] ([facility]). Interpreters are available.   o For health questions: Call 150-846-0163 or  1-952.337.6000 (7 a.m. to 7 p.m.)  o For questions about schools and childcare: Call 344-221-0004 or 1-487.190.6415 (7 a.m. to 7 p.m.)     Reason for Disposition    SEVERE back pain (e.g., excruciating, unable to do any normal activities) and not improved after pain medicine and CARE ADVICE    Additional Information    Negative: Passed out (i.e., fainted, collapsed and was not responding)    Negative: Shock suspected (e.g., cold/pale/clammy skin, too weak to stand, low BP, rapid pulse)    Negative: Sounds like a life-threatening emergency to the triager    Negative: Major injury to the back (e.g., MVA, fall > 10 feet or 3 meters, penetrating injury, etc.)    Negative: Pain in the upper back over the ribs (rib cage) that radiates (travels) into the chest    Negative: Pain in the upper back over the ribs (rib cage) and worsened by coughing (or clearly increases with breathing)    Negative: SEVERE back pain of sudden onset and age > 60    Negative: SEVERE abdominal pain (e.g., excruciating)    Negative: Abdominal pain and age > 60    Negative: Unable to urinate (or only a few drops) and bladder feels very full    Negative: Loss of bladder or bowel control (urine or bowel incontinence; wetting self, leaking stool) of new onset    Negative: Numbness (loss of sensation) in groin or rectal area    Negative: Pain radiates into groin, scrotum    Negative: Blood in urine (red, pink, or tea-colored)    Negative: Vomiting and pain over lower ribs of back (i.e., flank - kidney area)    Negative: Weakness of a leg or foot (e.g., unable to bear weight, dragging foot)    Negative: Patient sounds very sick or weak to the triager    Negative: Fever > 100.4 F (38.0 C) and flank pain    Negative: Pain or burning with passing urine (urination)    Protocols used: BACK PAIN-A-OH

## 2021-10-10 ENCOUNTER — HEALTH MAINTENANCE LETTER (OUTPATIENT)
Age: 31
End: 2021-10-10

## 2022-05-09 ASSESSMENT — ANXIETY QUESTIONNAIRES
GAD7 TOTAL SCORE: 19
GAD7 TOTAL SCORE: 19
5. BEING SO RESTLESS THAT IT IS HARD TO SIT STILL: SEVERAL DAYS
4. TROUBLE RELAXING: NEARLY EVERY DAY
GAD7 TOTAL SCORE: 19
6. BECOMING EASILY ANNOYED OR IRRITABLE: NEARLY EVERY DAY
3. WORRYING TOO MUCH ABOUT DIFFERENT THINGS: NEARLY EVERY DAY
1. FEELING NERVOUS, ANXIOUS, OR ON EDGE: NEARLY EVERY DAY
7. FEELING AFRAID AS IF SOMETHING AWFUL MIGHT HAPPEN: NEARLY EVERY DAY
2. NOT BEING ABLE TO STOP OR CONTROL WORRYING: NEARLY EVERY DAY
7. FEELING AFRAID AS IF SOMETHING AWFUL MIGHT HAPPEN: NEARLY EVERY DAY
8. IF YOU CHECKED OFF ANY PROBLEMS, HOW DIFFICULT HAVE THESE MADE IT FOR YOU TO DO YOUR WORK, TAKE CARE OF THINGS AT HOME, OR GET ALONG WITH OTHER PEOPLE?: SOMEWHAT DIFFICULT

## 2022-05-10 ASSESSMENT — ANXIETY QUESTIONNAIRES: GAD7 TOTAL SCORE: 19

## 2022-05-11 ENCOUNTER — APPOINTMENT (OUTPATIENT)
Dept: LAB | Facility: CLINIC | Age: 32
End: 2022-05-11
Attending: ADVANCED PRACTICE MIDWIFE
Payer: COMMERCIAL

## 2022-05-11 ENCOUNTER — OFFICE VISIT (OUTPATIENT)
Dept: OBGYN | Facility: CLINIC | Age: 32
End: 2022-05-11
Attending: ADVANCED PRACTICE MIDWIFE
Payer: COMMERCIAL

## 2022-05-11 VITALS
HEART RATE: 81 BPM | DIASTOLIC BLOOD PRESSURE: 81 MMHG | SYSTOLIC BLOOD PRESSURE: 114 MMHG | BODY MASS INDEX: 29.02 KG/M2 | HEIGHT: 63 IN | WEIGHT: 163.8 LBS

## 2022-05-11 DIAGNOSIS — F41.1 GAD (GENERALIZED ANXIETY DISORDER): ICD-10-CM

## 2022-05-11 DIAGNOSIS — Z12.4 ENCOUNTER FOR PAPANICOLAOU SMEAR FOR CERVICAL CANCER SCREENING: ICD-10-CM

## 2022-05-11 DIAGNOSIS — E06.3 HYPOTHYROIDISM DUE TO HASHIMOTO'S THYROIDITIS: ICD-10-CM

## 2022-05-11 DIAGNOSIS — F33.1 MODERATE EPISODE OF RECURRENT MAJOR DEPRESSIVE DISORDER (H): ICD-10-CM

## 2022-05-11 DIAGNOSIS — Z01.419 WELL WOMAN EXAM WITH ROUTINE GYNECOLOGICAL EXAM: Primary | ICD-10-CM

## 2022-05-11 LAB — TSH SERPL DL<=0.005 MIU/L-ACNC: 0.97 MU/L (ref 0.4–4)

## 2022-05-11 PROCEDURE — 84443 ASSAY THYROID STIM HORMONE: CPT | Performed by: ADVANCED PRACTICE MIDWIFE

## 2022-05-11 PROCEDURE — 99385 PREV VISIT NEW AGE 18-39: CPT | Performed by: ADVANCED PRACTICE MIDWIFE

## 2022-05-11 PROCEDURE — G0463 HOSPITAL OUTPT CLINIC VISIT: HCPCS

## 2022-05-11 PROCEDURE — G0145 SCR C/V CYTO,THINLAYER,RESCR: HCPCS | Performed by: ADVANCED PRACTICE MIDWIFE

## 2022-05-11 PROCEDURE — 87624 HPV HI-RISK TYP POOLED RSLT: CPT | Performed by: ADVANCED PRACTICE MIDWIFE

## 2022-05-11 PROCEDURE — 36415 COLL VENOUS BLD VENIPUNCTURE: CPT | Performed by: ADVANCED PRACTICE MIDWIFE

## 2022-05-11 RX ORDER — MIRTAZAPINE 15 MG/1
15 TABLET, FILM COATED ORAL
COMMUNITY
Start: 2021-06-01 | End: 2024-03-01

## 2022-05-11 RX ORDER — PRAZOSIN HYDROCHLORIDE 2 MG/1
2 CAPSULE ORAL
COMMUNITY
Start: 2021-12-15 | End: 2024-04-19
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
## 2022-05-11 NOTE — PATIENT INSTRUCTIONS
Annual Well Person Health Information    Thank you for coming in for your routine annual wellness exam. I recommend annual visits so that we can stay connected on your overall health status. Here are some recommendations to consider!    Nutrition: There is excellent information about nutrition needs at https://www.nutrition.gov/topics/nutrition-life-stage/adults/women  The amount of calories you need is based on your physical activity level, age, height, weight, and other unique health considerations, such as whether you are pregnant or breastfeeding. To get a personalized calorie recommendation, use the MyPlate Plan tool: https://www.myplate.gov/myplate-plan  Make sure you are getting enough of the following vitamins and minerals:  Iron: Ages 19 to 50: 18 mg, During pregnancy: 27 mg. Ages 51 and older: 8 mg  Calcium: Adults need 1,000 mg of calcium each day; After menopause, you need 1,200 mg of calcium each day.  Vitamin D3: You can take 25 mg to 125 mg daily. I recommend starting with 25-50 mg daily and rechecking your Vitamin D at a future appointmet.  Vitamin B12: For those who are over the age of 50 or vegetarians, it is important to get adequate Vitamin B12 in your diet or supplement with a daily vitamin.  Folate: It is important to get at least 400 mcg of folic acid daily for people who have not reached menopause yet.    Exercise: Exercise is important. It is recommended that you engage in moderate intensity exercise 150 minutes each week. Weight bearing exercise is also really important for long term bone health. Weight lifting and/or yoga are recommended at least twice weekly.    Sexually Transmitted Infection Screening: The Centers for Disease Control recommends that all sexually active women under 25 years of age and sexually active women 25 years of age and older if at increased risk (those who have a new sex partner, more than one sex partner, a sex partner with concurrent partners, or a sex partner  "who has an STI) be screened for sexually transmitted infections. All adults should be screened for HIV at least once.    Diabetes Screening: All patients should be screened for diabetes at three-year intervals beginning at age 45, especially people who are overweight or obese. If multiple risk factors are present, screening should be done at an earlier age and more frequently.     Immunizations: Make sure you keep your vaccines up to date. The Tetanus, Diptheria and Pertussis vaccine is due every 10 years. I recommend the influenza vaccine annually when it is available in September/October. COVID vaccination is safe and recommended for all people.     Sunscreen: Even though we live in [place], you still need sunscreen protection year round anytime you are outside. In times when you are outside for an extended period of time, you will want to reapply the sunscreen every 2 hours or after swimming.     Cervical Cancer Screening: Cervical cancer screening is recommended for all people with a cervix age 21-65. Screening should occur every 3-5 years depending on age and previous history. After the age of 30, screening should include the pap smear with human papilloma virus testing. Those with abnormal screening may need additional screening or testing. 90% of cervical cancer is caused by the human papilloma virus (HPV) which is sexually transmitted. People age 9-45 are eligible for the HPV     Mammogram Screening: We recommend annual screening starting at age 40 for most people. If you have a family history of breast cancer, please talk to the midwife about genetic screening and early mammogram options.    Colon Cancer Screening: The American Cancer Society \"recommends that people at average risk* of colorectal cancer start regular screening at age 45. This can be done either with a sensitive test that looks for signs of cancer in a person s stool (a stool-based test), or with an exam that looks at the colon and rectum " "(a visual exam). These options are listed below. People who are in good health and with a life expectancy of more than 10 years should continue regular colorectal cancer screening through the age of 75. For people ages 76 through 85, the decision to be screened should be based on a person s preferences, life expectancy, overall health, and prior screening history. People over 85 should no longer get colorectal cancer screening.\"     *For screening, people are considered to be at average risk if they do not have:    A personal history of colorectal cancer or certain types of polyps  A family history of colorectal cancer  A personal history of inflammatory bowel disease (ulcerative colitis or Crohn s disease)  A confirmed or suspected hereditary colorectal cancer syndrome, such as familial adenomatous polyposis (FAP) or Reynolds syndrome (hereditary non-polyposis colon cancer or HNPCC)  A personal history of getting radiation to the abdomen (belly) or pelvic area to treat a prior cancer     "

## 2022-05-15 LAB
BKR LAB AP GYN ADEQUACY: NORMAL
BKR LAB AP GYN INTERPRETATION: NORMAL
BKR LAB AP HPV REFLEX: NORMAL
BKR LAB AP LMP: NORMAL
BKR LAB AP PREVIOUS ABNORMAL: NORMAL
PATH REPORT.COMMENTS IMP SPEC: NORMAL
PATH REPORT.COMMENTS IMP SPEC: NORMAL
PATH REPORT.RELEVANT HX SPEC: NORMAL

## 2022-05-17 LAB
HUMAN PAPILLOMA VIRUS 16 DNA: NEGATIVE
HUMAN PAPILLOMA VIRUS 18 DNA: NEGATIVE
HUMAN PAPILLOMA VIRUS FINAL DIAGNOSIS: NORMAL
HUMAN PAPILLOMA VIRUS OTHER HR: NEGATIVE

## 2022-08-03 NOTE — TELEPHONE ENCOUNTER
RECORDS RECEIVED FROM: internal    DATE RECEIVED: 9.21.22    NOTES (FOR ALL VISITS) STATUS DETAILS   OFFICE NOTES from referring provider internal  Elyse Conn     MEDICATION LIST internal       LABS     DIABETES: HBGA1C, CREATININE, FASTING LIPIDS, MICROALBUMIN URINE, POTASSIUM, TSH, T4    THYROID: TSH, T4, CBC, THYRODLONULIN, TOTAL T3, FREE T4, CALCITONIN, CEA internal  TSH-5.11.22   T4- 5.11.22

## 2022-09-01 NOTE — TELEPHONE ENCOUNTER
RECORDS RECEIVED FROM: Hypothyroidism due to Hashimoto's Thyroiditis referred by Elyse Conn   DATE RECEIVED: 12/14/2022   NOTES (FOR ALL VISITS) STATUS DETAILS   OFFICE NOTES from referring provider Internal [facility]:  5/11/22 - OBGYN OV with Elyse Conn CNM   OFFICE NOTES from other specialist N/A    ED NOTES N/A    OPERATIVE REPORT  (thyroid, pituitary, adrenal, parathyroid) N/A    MEDICATION LIST Care Everywhere    IMAGING  N/A    LABS     DIABETES: HBGA1C, CREATININE, FASTING LIPIDS, MICROALBUMIN URINE, POTASSIUM, TSH, T4    THYROID: TSH, T4, CBC, THYRODLONULIN, TOTAL T3, FREE T4, CALCITONIN, CEA Care Everywhere / Internal [facility]:  8/7/22 - BMP  8/7/22 - CBC    [facility]:  5/11/22 - TSH, T4  2/8/19 - Glucose  2/8/19 - Lipid

## 2022-09-18 ENCOUNTER — HEALTH MAINTENANCE LETTER (OUTPATIENT)
Age: 32
End: 2022-09-18

## 2022-09-21 ENCOUNTER — PRE VISIT (OUTPATIENT)
Dept: ENDOCRINOLOGY | Facility: CLINIC | Age: 32
End: 2022-09-21

## 2022-12-14 ENCOUNTER — PRE VISIT (OUTPATIENT)
Dept: ENDOCRINOLOGY | Facility: CLINIC | Age: 32
End: 2022-12-14

## 2023-07-25 NOTE — LETTER
March 28, 2019      To Whom It May Concern:      Fiona Henry was seen in our Emergency Department today, 03/28/19.  I expect her condition to improve over the next *** days.  She may return to work/school when improved.    Sincerely,        Mayra Carpenter RN        
  March 28, 2019      To Whom It May Concern:      Fiona Henry was seen in our Emergency Department today, 03/28/19.  I expect her condition to improve over the next 3days.  She may return to work/school when improved.    Sincerely,        Rufus Monroe PA-C        
  March 28, 2019      To Whom It May Concern:      Fiona Henry was seen in our Emergency Department today, 03/28/19.  I expect her condition to improve over the next three days.  She may return to work/school when improved.    Sincerely,                
  March 28, 2019      To Whom It May Concern:      Fiona Henry was seen in our Emergency Department today, 03/28/19.  I expect her condition to improve over the next three days.  She may return to work/school when improved.    Sincerely,                 
No indicators present

## 2023-07-30 ENCOUNTER — HEALTH MAINTENANCE LETTER (OUTPATIENT)
Age: 33
End: 2023-07-30

## 2023-08-17 ENCOUNTER — OFFICE VISIT (OUTPATIENT)
Dept: PODIATRY | Facility: CLINIC | Age: 33
End: 2023-08-17
Payer: COMMERCIAL

## 2023-08-17 VITALS
SYSTOLIC BLOOD PRESSURE: 125 MMHG | DIASTOLIC BLOOD PRESSURE: 85 MMHG | WEIGHT: 161 LBS | BODY MASS INDEX: 28.53 KG/M2 | HEIGHT: 63 IN

## 2023-08-17 DIAGNOSIS — S90.31XA CONTUSION OF RIGHT FOOT, INITIAL ENCOUNTER: Primary | ICD-10-CM

## 2023-08-17 PROCEDURE — 99203 OFFICE O/P NEW LOW 30 MIN: CPT | Performed by: PODIATRIST

## 2023-08-17 NOTE — Clinical Note
"    8/17/2023         RE: Fiona Henry  [address]  [facility] [address]        Dear Colleague,    Thank you for referring your patient, Fiona Henry, to the [facility] POD[facility]. Please see a copy of my visit note below.    Foot & Ankle Surgery  August 17, 2023    CC: \"Dropped book on my foot on 8/9/2023.  Some bruising and aching but no pain when walking.  Bruise has grown in size last few days.  Walked a lot over the weekend\"    I was asked to see Fiona Henry regarding the chief complaint by: Self    HPI:  Pt is a 33 year old female who presents with above complaint.  8-day history of dorsal right foot bruising.  She dropped a children's picture book on her foot.  She states the bruise has grown in size.  Her father was in town recently and they \"did a lot of walking\".  She states the pain levels are very minimal but she is \"paranoid\" and want to make sure nothing is wrong.  Pain 2 out of 10 \"only when bruises touch, some aching at night\".  She has iced the foot that has helped with pain.    ROS:   Pos for CC.  The patient denies current nausea, vomiting, chills, fevers, belly pain, calf pain, chest pain or SOB.  Complete remainder of ROS is otherwise neg.    VITALS:    Vitals:    08/17/23 1052   BP: 125/85   Weight: 73 kg (161 lb)   Height: 1.6 m (5' 3\")       PMH:    Past Medical History:   Diagnosis Date    Anxiety     Depressive disorder Life long    Consistently goes to psychiatrist for the past 10 years    Hashimoto's thyroiditis        SXHX:    Past Surgical History:   Procedure Laterality Date    adnoidectomy Bilateral     tonsillectomy Bilateral         MEDS:    Current Outpatient Medications   Medication    vortioxetine (TRINTELLIX) 20 MG tablet    ALPRAZolam (XANAX) 1 MG tablet    cyclobenzaprine (FLEXERIL) 10 MG tablet    mirtazapine (REMERON) 15 MG tablet    prazosin (MINIPRESS) 2 MG capsule     No current facility-administered medications for this visit. "       ALL:   No Known Allergies    FMH:    Family History   Problem Relation Age of Onset    Bipolar Disorder Mother     Depression Mother         Major depressive disorder, sometimes hospitalized and treated with ECT    Diabetes Father         Type II    Heart Disease Father         Hx Triple Bypass    Depression Maternal Grandfather         Bipolar, hospitalized sometimes, teeated with ECT    Other Cancer Paternal Grandfather         Lung cancer and heart attacks    Diabetes Paternal Grandfather         Type II       SocHx:    Social History     Socioeconomic History    Marital status:      Spouse name: Not on file    Number of children: Not on file    Years of education: Not on file    Highest education level: Not on file   Occupational History    Not on file   Tobacco Use    Smoking status: Never    Smokeless tobacco: Never   Substance and Sexual Activity    Alcohol use: Not Currently     Comment: Rarely    Drug use: No    Sexual activity: Yes     Partners: Male     Birth control/protection: None   Other Topics Concern    Not on file   Social History Narrative    Not on file     Social Determinants of Health     Financial Resource Strain: Not on file   Food Insecurity: Not on file   Transportation Needs: Not on file   Physical Activity: Not on file   Stress: Not on file   Social Connections: Not on file   Intimate Partner Violence: Not on file   Housing Stability: Not on file           EXAMINATION:  Gen:   No apparent distress  Neuro:   A&Ox3, no deficits  Psych:    Answering questions appropriately for age and situation with normal affect  Head:    NCAT  Eye:    Visual scanning without deficit  Ear:    Response to auditory stimuli wnl  Lung:    Non-labored breathing on RA noted  Abd:    NTND per patient report  Lymph:    Small bruise dorsal right foot near the second and first metatarsals .  Vasc:    Pulses palpable, CFT minimally delayed  Neuro:    Light touch sensation intact to all sensory nerve  distributions without paresthesias  Derm:    Neg for nodules, lesions or ulcerations  MSK:    mild tenderness on palpation along the bruise.  Slight second and first metatarsal pain.  No Lisfranc complex pain  Calf:    Neg for redness, swelling or tenderness    Assessment:  33 year old female with bruise dorsal right foot after dropping a children's picture book on her foot      Medical Decision Making/Plan:  Discussed etiologies, anatomy and options  1.  Dorsal right foot bruise after dropping a children's picture book on her foot  -I personally reviewed and interpreted the patient's lower extremity history pertinent to today's visit, including imaging/labs, in preparation for initiating a treatment program.  -The patient has mild tenderness on palpation of the area but she has no pain with weightbearing or activities.  We discussed the option for x-rays but we both agree they are not necessary as she is unlikely to have sustained a fracture  -Comfortable shoes, RICE/NSAID versus Tylenol as needed based on pain.  If symptoms fail to resolve over the next few weeks or months, she will follow-up for x-rays and consider boot.  Otherwise, no further follow-up is necessary    Follow up:  prn or sooner with acute issues      Patient's medical history was reviewed today      Alfa Campbell DPM FACFAS FACFAOM  Podiatric Foot & Ankle Surgeon  [facility]  [phone]    Disclaimer: This note consists of symbols derived from keyboarding, dictation and/or voice recognition software. As a result, there may be errors in the script that have gone undetected. Please consider this when interpreting information found in this chart.            Again, thank you for allowing me to participate in the care of your patient.        Sincerely,        Alfa Campbell DPM, [place]

## 2023-08-17 NOTE — PROGRESS NOTES
"Foot & Ankle Surgery  August 17, 2023    CC: \"Dropped book on my foot on 8/9/2023.  Some bruising and aching but no pain when walking.  Bruise has grown in size last few days.  Walked a lot over the weekend\"    I was asked to see Fiona Henry regarding the chief complaint by: Self    HPI:  Pt is a 33 year old female who presents with above complaint.  8-day history of dorsal right foot bruising.  She dropped a children's picture book on her foot.  She states the bruise has grown in size.  Her father was in town recently and they \"did a lot of walking\".  She states the pain levels are very minimal but she is \"paranoid\" and want to make sure nothing is wrong.  Pain 2 out of 10 \"only when bruises touch, some aching at night\".  She has iced the foot that has helped with pain.    ROS:   Pos for CC.  The patient denies current nausea, vomiting, chills, fevers, belly pain, calf pain, chest pain or SOB.  Complete remainder of ROS is otherwise neg.    VITALS:    Vitals:    08/17/23 1052   BP: 125/85   Weight: 73 kg (161 lb)   Height: 1.6 m (5' 3\")       PMH:    Past Medical History:   Diagnosis Date    Anxiety     Depressive disorder Life long    Consistently goes to psychiatrist for the past 10 years    Hashimoto's thyroiditis        SXHX:    Past Surgical History:   Procedure Laterality Date    adnoidectomy Bilateral     tonsillectomy Bilateral         MEDS:    Current Outpatient Medications   Medication    vortioxetine (TRINTELLIX) 20 MG tablet    ALPRAZolam (XANAX) 1 MG tablet    cyclobenzaprine (FLEXERIL) 10 MG tablet    mirtazapine (REMERON) 15 MG tablet    prazosin (MINIPRESS) 2 MG capsule     No current facility-administered medications for this visit.       ALL:   No Known Allergies    FMH:    Family History   Problem Relation Age of Onset    Bipolar Disorder Mother     Depression Mother         Major depressive disorder, sometimes hospitalized and treated with ECT    Diabetes Father         Type II    Heart " Disease Father         Hx Triple Bypass    Depression Maternal Grandfather         Bipolar, hospitalized sometimes, teeated with ECT    Other Cancer Paternal Grandfather         Lung cancer and heart attacks    Diabetes Paternal Grandfather         Type II       SocHx:    Social History     Socioeconomic History    Marital status:      Spouse name: Not on file    Number of children: Not on file    Years of education: Not on file    Highest education level: Not on file   Occupational History    Not on file   Tobacco Use    Smoking status: Never    Smokeless tobacco: Never   Substance and Sexual Activity    Alcohol use: Not Currently     Comment: Rarely    Drug use: No    Sexual activity: Yes     Partners: Male     Birth control/protection: None   Other Topics Concern    Not on file   Social History Narrative    Not on file     Social Determinants of Health     Financial Resource Strain: Not on file   Food Insecurity: Not on file   Transportation Needs: Not on file   Physical Activity: Not on file   Stress: Not on file   Social Connections: Not on file   Intimate Partner Violence: Not on file   Housing Stability: Not on file           EXAMINATION:  Gen:   No apparent distress  Neuro:   A&Ox3, no deficits  Psych:    Answering questions appropriately for age and situation with normal affect  Head:    NCAT  Eye:    Visual scanning without deficit  Ear:    Response to auditory stimuli wnl  Lung:    Non-labored breathing on RA noted  Abd:    NTND per patient report  Lymph:    Small bruise dorsal right foot near the second and first metatarsals .  Vasc:    Pulses palpable, CFT minimally delayed  Neuro:    Light touch sensation intact to all sensory nerve distributions without paresthesias  Derm:    Neg for nodules, lesions or ulcerations  MSK:    mild tenderness on palpation along the bruise.  Slight second and first metatarsal pain.  No Lisfranc complex pain  Calf:    Neg for redness, swelling or  tenderness    Assessment:  33 year old female with bruise dorsal right foot after dropping a children's picture book on her foot      Medical Decision Making/Plan:  Discussed etiologies, anatomy and options  1.  Dorsal right foot bruise after dropping a children's picture book on her foot  -I personally reviewed and interpreted the patient's lower extremity history pertinent to today's visit, including imaging/labs, in preparation for initiating a treatment program.  -The patient has mild tenderness on palpation of the area but she has no pain with weightbearing or activities.  We discussed the option for x-rays but we both agree they are not necessary as she is unlikely to have sustained a fracture  -Comfortable shoes, RICE/NSAID versus Tylenol as needed based on pain.  If symptoms fail to resolve over the next few weeks or months, she will follow-up for x-rays and consider boot.  Otherwise, no further follow-up is necessary    Follow up:  prn or sooner with acute issues      Patient's medical history was reviewed today      Alfa Campbell DPM FACFAS FACFAOM  Podiatric Foot & Ankle Surgeon  [facility]  [phone]    Disclaimer: This note consists of symbols derived from keyboarding, dictation and/or voice recognition software. As a result, there may be errors in the script that have gone undetected. Please consider this when interpreting information found in this chart.

## 2023-09-19 ENCOUNTER — APPOINTMENT (OUTPATIENT)
Dept: URBAN - METROPOLITAN AREA CLINIC 253 | Age: 33
Setting detail: DERMATOLOGY
End: 2023-09-19

## 2023-09-19 VITALS — HEIGHT: 63 IN | WEIGHT: 165 LBS | RESPIRATION RATE: 14 BRPM

## 2023-09-19 DIAGNOSIS — L72.0 EPIDERMAL CYST: ICD-10-CM

## 2023-09-19 DIAGNOSIS — Z71.89 OTHER SPECIFIED COUNSELING: ICD-10-CM

## 2023-09-19 DIAGNOSIS — L81.4 OTHER MELANIN HYPERPIGMENTATION: ICD-10-CM

## 2023-09-19 DIAGNOSIS — L82.1 OTHER SEBORRHEIC KERATOSIS: ICD-10-CM

## 2023-09-19 DIAGNOSIS — D18.0 HEMANGIOMA: ICD-10-CM

## 2023-09-19 DIAGNOSIS — D22 MELANOCYTIC NEVI: ICD-10-CM

## 2023-09-19 PROBLEM — D18.01 HEMANGIOMA OF SKIN AND SUBCUTANEOUS TISSUE: Status: ACTIVE | Noted: 2023-09-19

## 2023-09-19 PROBLEM — D22.5 MELANOCYTIC NEVI OF TRUNK: Status: ACTIVE | Noted: 2023-09-19

## 2023-09-19 PROCEDURE — 99203 OFFICE O/P NEW LOW 30 MIN: CPT

## 2023-09-19 PROCEDURE — OTHER ADDITIONAL NOTES: OTHER

## 2023-09-19 PROCEDURE — OTHER MIPS QUALITY: OTHER

## 2023-09-19 PROCEDURE — OTHER COUNSELING: OTHER

## 2023-09-19 PROCEDURE — OTHER PRESCRIPTION: OTHER

## 2023-09-19 RX ORDER — DOXYCYCLINE 100 MG/1
100 MG TABLET, FILM COATED ORAL
Qty: 28 | Refills: 0 | Status: ERX | COMMUNITY
Start: 2023-09-19

## 2023-09-19 ASSESSMENT — LOCATION SIMPLE DESCRIPTION DERM
LOCATION SIMPLE: LOWER BACK
LOCATION SIMPLE: CHEST
LOCATION SIMPLE: UPPER BACK

## 2023-09-19 ASSESSMENT — LOCATION ZONE DERM: LOCATION ZONE: TRUNK

## 2023-09-19 ASSESSMENT — LOCATION DETAILED DESCRIPTION DERM
LOCATION DETAILED: LEFT MEDIAL SUPERIOR CHEST
LOCATION DETAILED: SUPERIOR LUMBAR SPINE
LOCATION DETAILED: INFERIOR THORACIC SPINE

## 2023-09-19 NOTE — HPI: FULL BODY SKIN EXAMINATION
What Type Of Note Output Would You Prefer (Optional)?: Standard Output
What Is The Reason For Today's Visit?: Full Body Skin Examination
What Is The Reason For Today's Visit? (Being Monitored For X): concerning skin lesions on an annual basis
Additional History: FBE. No concerns today except for The patient reports they have an infected cyst on their chest that has been there for a couple years but became inflamed last Monday.

## 2023-09-19 NOTE — PROCEDURE: ADDITIONAL NOTES
Additional Notes: Informed patient that this can be removed via an excision once the cyst calms down. Advised patient to schedule a 30 minute excision in about one month.\\nIf the cyst has not calmed down by Friday (Sept 22nd), encouraged patient to follow up for an I&D.
Render Risk Assessment In Note?: no
Detail Level: Simple
Additional Notes: A clinical assistant was present for the exam. Care instructions were explained to the patient in detail. Told patient to call with any concerns or questions. The patient verbalized understanding and agreement of the education provided and the treatment plan. Encouraged patient to schedule a follow up appointment right after visit. At the end of the visit, all questions had been answered and the patient was satisfied with the visit.

## 2023-09-26 ENCOUNTER — APPOINTMENT (OUTPATIENT)
Dept: URBAN - METROPOLITAN AREA CLINIC 253 | Age: 33
Setting detail: DERMATOLOGY
End: 2023-09-26

## 2023-09-26 VITALS — WEIGHT: 165 LBS | HEIGHT: 63 IN

## 2023-09-26 DIAGNOSIS — L72.0 EPIDERMAL CYST: ICD-10-CM

## 2023-09-26 PROCEDURE — OTHER ADDITIONAL NOTES: OTHER

## 2023-09-26 PROCEDURE — 99212 OFFICE O/P EST SF 10 MIN: CPT

## 2023-09-26 PROCEDURE — OTHER COUNSELING: OTHER

## 2023-09-26 PROCEDURE — OTHER MIPS QUALITY: OTHER

## 2023-09-26 ASSESSMENT — LOCATION DETAILED DESCRIPTION DERM: LOCATION DETAILED: LEFT MEDIAL SUPERIOR CHEST

## 2023-09-26 ASSESSMENT — LOCATION ZONE DERM: LOCATION ZONE: TRUNK

## 2023-09-26 ASSESSMENT — LOCATION SIMPLE DESCRIPTION DERM: LOCATION SIMPLE: CHEST

## 2023-09-26 NOTE — PROCEDURE: ADDITIONAL NOTES
Detail Level: Simple
Render Risk Assessment In Note?: no
Additional Notes: Discussed waiting on I&D and considered steroid injection. Patient to wait and continue taking doxycycline and return in a few weeks for an excision. We will extend doxy one more week as she is having improvement \\nPatient advised to continue taking ibuprofen at night.\\n\\nA clinical assistant was present for the exam. Care instructions were explained to the patient in detail. Told patient to call with any concerns or questions. The patient verbalized understanding and agreement of the education provided and the treatment plan. Encouraged patient to schedule a follow up appointment right after visit. At the end of the visit, all questions had been answered and the patient was satisfied with the visit.

## 2023-10-27 ENCOUNTER — APPOINTMENT (OUTPATIENT)
Dept: URBAN - METROPOLITAN AREA CLINIC 253 | Age: 33
Setting detail: DERMATOLOGY
End: 2023-10-27

## 2023-10-27 VITALS — WEIGHT: 165 LBS | RESPIRATION RATE: 14 BRPM | HEIGHT: 63 IN

## 2023-10-27 DIAGNOSIS — L72.0 EPIDERMAL CYST: ICD-10-CM

## 2023-10-27 PROCEDURE — 11402 EXC TR-EXT B9+MARG 1.1-2 CM: CPT

## 2023-10-27 PROCEDURE — 12032 INTMD RPR S/A/T/EXT 2.6-7.5: CPT

## 2023-10-27 PROCEDURE — OTHER COUNSELING: OTHER

## 2023-10-27 PROCEDURE — OTHER EXCISION: OTHER

## 2023-10-27 PROCEDURE — OTHER ADDITIONAL NOTES: OTHER

## 2023-10-27 ASSESSMENT — LOCATION ZONE DERM: LOCATION ZONE: TRUNK

## 2023-10-27 ASSESSMENT — LOCATION SIMPLE DESCRIPTION DERM: LOCATION SIMPLE: CHEST

## 2023-10-27 ASSESSMENT — LOCATION DETAILED DESCRIPTION DERM: LOCATION DETAILED: LEFT MEDIAL SUPERIOR CHEST

## 2023-10-27 NOTE — PROCEDURE: EXCISION

## 2023-10-27 NOTE — PROCEDURE: ADDITIONAL NOTES
Render Risk Assessment In Note?: no
Detail Level: Simple
Additional Notes: Patient tolerated procedure well. Went over signs and symptoms of hematoma and infection in detail including but not limited to swelling, increased pain, foul smelling discharge, wound dehiscence and bleeding. Instructed patient to call provider line which is open 24/7 ASAP with any concerns.\\n\\nA clinical assistant was present for the exam. Care instructions of treated site were explained to the patient in detail. Told patient to call with any concerns or questions. The patient verbalized understanding and agreement of the education provided and the treatment plan. Encouraged patient to schedule a follow up appointment right after visit. At the end of the visit, all questions had been answered and the patient was satisfied with the visit.

## 2023-11-08 ENCOUNTER — APPOINTMENT (OUTPATIENT)
Dept: URBAN - METROPOLITAN AREA CLINIC 253 | Age: 33
Setting detail: DERMATOLOGY
End: 2023-11-08

## 2023-11-08 DIAGNOSIS — Z48.02 ENCOUNTER FOR REMOVAL OF SUTURES: ICD-10-CM

## 2023-11-08 PROCEDURE — OTHER SUTURE REMOVAL (GLOBAL PERIOD): OTHER

## 2023-11-08 PROCEDURE — OTHER PHOTO-DOCUMENTATION: OTHER

## 2023-11-08 PROCEDURE — OTHER ADDITIONAL NOTES: OTHER

## 2023-11-08 ASSESSMENT — LOCATION DETAILED DESCRIPTION DERM: LOCATION DETAILED: LEFT MEDIAL SUPERIOR CHEST

## 2023-11-08 ASSESSMENT — LOCATION SIMPLE DESCRIPTION DERM: LOCATION SIMPLE: CHEST

## 2023-11-08 ASSESSMENT — LOCATION ZONE DERM: LOCATION ZONE: TRUNK

## 2023-11-08 NOTE — PROCEDURE: ADDITIONAL NOTES
Render Risk Assessment In Note?: no
Detail Level: Simple
Additional Notes: Patient had an inflammatory reaction to the adhesive and bandage. She will contact office in one week if it doesn’t improve or resolve.

## 2023-11-08 NOTE — PROCEDURE: SUTURE REMOVAL (GLOBAL PERIOD)
Detail Level: Detailed
Add 30739 Cpt? (Important Note: In 2017 The Use Of 26533 Is Being Tracked By Cms To Determine Future Global Period Reimbursement For Global Periods): no

## 2024-02-01 ENCOUNTER — NURSE TRIAGE (OUTPATIENT)
Dept: FAMILY MEDICINE | Facility: CLINIC | Age: 34
End: 2024-02-01
Payer: COMMERCIAL

## 2024-02-01 ENCOUNTER — MYC MEDICAL ADVICE (OUTPATIENT)
Dept: FAMILY MEDICINE | Facility: CLINIC | Age: 34
End: 2024-02-01
Payer: COMMERCIAL

## 2024-02-01 DIAGNOSIS — U07.1 INFECTION DUE TO 2019 NOVEL CORONAVIRUS: Primary | ICD-10-CM

## 2024-02-01 NOTE — TELEPHONE ENCOUNTER
RN COVID TREATMENT VISIT  02/01/24      The patient has been triaged and does not require a higher level of care.    Fiona Henry  33 year old  Current weight? 161 lbs    Has the patient been seen by a primary care provider at an [facility] or [facility] within the past two years? Yes.   Have you been in close proximity to/do you have a known exposure to a person with a confirmed case of influenza? No.     General treatment eligibility:  Date of positive COVID test (PCR or at home)?  1/31/2024    Are you or have you been hospitalized for this COVID-19 infection? No.   Have you received monoclonal antibodies or antiviral treatment for COVID-19 since this positive test? No.   Do you have any of the following conditions that place you at risk of being very sick from COVID-19?   - Mental health disorders including mood disorders, depression, schizophrenia spectrum disorders   Yes, patient has at least one high risk condition as noted above.     Current COVID symptoms:   - cough  - muscle or body aches  - headache  - sore throat  - congestion or runny nose  Yes. Patient has at least one symptom as selected.     How many days since symptoms started? 5 days or less. Established patient, 12 years or older weighing at least 88.2 lbs, who has symptoms that started in the past 5 days, has not been hospitalized nor received treatment already, and is at risk for being very sick from COVID-19.     Treatment eligibility by RN:  Are you currently pregnant or nursing? No  Do you have a clinically significant hypersensitivity to nirmatrelvir or ritonavir, or toxic epidermal necrolysis (TEN) or Woodruff-Kaiden Syndrome? No  Do you have a history of hepatitis, any hepatic impairment on the Problem List (such as Child-Montejo Class C, cirrhosis, fatty liver disease, alcoholic liver disease), or was the last liver lab (hepatic panel, ALT, AST, ALK Phos, bilirubin) elevated in the past 6 months? No  Do you  have any history of severe renal impairment (eGFR < 30mL/min)? No    Is patient eligible to continue? Yes, patient meets all eligibility requirements for the RN COVID treatment (as denoted by all no responses above).     Current Outpatient Medications   Medication Sig Dispense Refill    ALPRAZolam (XANAX) 1 MG tablet Take 1 tablet (1 mg) by mouth 3 times daily as needed for anxiety 30 tablet 0    cyclobenzaprine (FLEXERIL) 10 MG tablet Take 1 tablet by mouth three times daily as needed for muscle spasm 30 tablet 0    mirtazapine (REMERON) 15 MG tablet Take 15 mg by mouth      prazosin (MINIPRESS) 2 MG capsule Take 2 mg by mouth      vortioxetine (TRINTELLIX) 20 MG tablet Take 20 mg by mouth daily         Medications from List 1 of the standing order (on medications that exclude the use of Paxlovid) that patient is taking: NONE. Is patient taking Jud's Wort? No  Is patient taking Jud's Wort or any meds from List 1? No.   Medications from List 2 of the standing order (on meds that provider needs to adjust) that patient is taking: NONE. Is patient on any of the meds from List 2? No.   Medications from List 3 of standing order (on meds that a RN needs to adjust) that patient is taking: alprazolam (Xanax): Is patient taking as needed and less than daily? Yes, instructed patient to stop taking alprazolam while taking Paxlovid and restart alprazolam 3 days after the completion of Paxlovid.  bupropion (Wellbutrin, Zyban): Instructed patient to continue taking buproprion as directed but know that it may have less effect while taking Paxlovid.  Is patient on any meds from List 3? Yes. Patient is on meds from list 3. No meds require a provider visit and at least one med required RN to adjust.     Paxlovid has an approximate 90% reduction in hospitalization. Paxlovid can possibly cause altered sense of taste, diarrhea (loose, watery stools), high blood pressure, muscle aches.     Would patient like a Paxlovid  "prescription?   Yes.   No results found for: \"GFRESTIMATED\"    Was last eGFR reduced? No, eGFR 60 or greater/ No Result on record. Patient can receive the normal renal function dose. Paxlovid Rx sent to [place] [facility]   [facility]    Temporary change to home medications: None    All medication adjustments (holds, etc) were discussed with the patient and patient was asked to repeat back (teachback) their med adjustment.  Did patient understand med adjustment? Yes, patient repeated back and understood correctly.        Reviewed the following instructions with the patient:    Paxlovid (nimatrelvir and ritonavir)    How it works  Two medicines (nirmatrelvir and ritonavir) are taken together. They stop the virus from growing. Less amount of virus is easier for your body to fight.    How to take  Medicine comes in a daily container with both medicine tablets. Take by mouth twice daily (once in the morning, once at night) for 5 days.  The number of tablets to take varies by patient.  Don't chew or break capsules. Swallow whole.    When to take  Take as soon as possible after positive COVID-19 test result, and within 5 days of your first symptoms.    Possible side effects  Can cause altered sense of taste, diarrhea (loose, watery stools), high blood pressure, muscle aches.    Robert To RN       Reason for Disposition   HIGH RISK patient (e.g., weak immune system, age > 64 years, obesity with BMI of 30 or higher, pregnant, chronic lung disease or other chronic medical condition) and COVID symptoms (e.g., cough, fever)  (Exceptions: Already seen by doctor or NP/PA and no new or worsening symptoms.)    Additional Information   Negative: SEVERE difficulty breathing (e.g., struggling for each breath, speaks in single words)   Negative: Difficult to awaken or acting confused (e.g., disoriented, slurred speech)   Negative: Bluish (or gray) lips or face now   Negative: Shock suspected (e.g., cold/pale/clammy skin, too weak to stand, " low BP, rapid pulse)   Negative: Sounds like a life-threatening emergency to the triager   Negative: Diagnosed or suspected COVID-19 and symptoms lasting 3 or more weeks   Negative: COVID-19 exposure and no symptoms   Negative: COVID-19 vaccine reaction suspected (e.g., fever, headache, muscle aches) occurring 1 to 3 days after getting vaccine   Negative: COVID-19 vaccine, questions about   Negative: Lives with someone known to have influenza (flu test positive) and flu-like symptoms (e.g., cough, runny nose, sore throat, SOB; with or without fever)   Negative: Possible COVID-19 symptoms and triager concerned about severity of symptoms or other causes   Negative: COVID-19 and breastfeeding, questions about   Negative: SEVERE or constant chest pain or pressure  (Exception: Mild central chest pain, present only when coughing.)   Negative: MODERATE difficulty breathing (e.g., speaks in phrases, SOB even at rest, pulse 100-120)   Negative: Headache and stiff neck (can't touch chin to chest)   Negative: Oxygen level (e.g., pulse oximetry) 90% or lower   Negative: Chest pain or pressure  (Exception: MILD central chest pain, present only when coughing.)   Negative: Drinking very little and dehydration suspected (e.g., no urine > 12 hours, very dry mouth, very lightheaded)   Negative: Patient sounds very sick or weak to the triager   Negative: MILD difficulty breathing (e.g., minimal/no SOB at rest, SOB with walking, pulse <100)   Negative: Fever > 103 F (39.4 C)   Negative: Fever > 101 F (38.3 C) and over 60 years of age   Negative: Fever > 100.0 F (37.8 C) and bedridden (e.g., CVA, chronic illness, recovering from surgery)    Protocols used: Coronavirus (COVID-19) Diagnosed or [id number]

## 2024-02-04 ENCOUNTER — MYC REFILL (OUTPATIENT)
Dept: FAMILY MEDICINE | Facility: CLINIC | Age: 34
End: 2024-02-04
Payer: COMMERCIAL

## 2024-02-04 DIAGNOSIS — M54.42 ACUTE BILATERAL LOW BACK PAIN WITH LEFT-SIDED SCIATICA: ICD-10-CM

## 2024-02-05 RX ORDER — CYCLOBENZAPRINE HCL 10 MG
10 TABLET ORAL 3 TIMES DAILY PRN
Qty: 30 TABLET | Refills: 0 | OUTPATIENT
Start: 2024-02-05

## 2024-02-08 ENCOUNTER — APPOINTMENT (OUTPATIENT)
Dept: URBAN - METROPOLITAN AREA CLINIC 253 | Age: 34
Setting detail: DERMATOLOGY
End: 2024-02-08

## 2024-02-08 VITALS — RESPIRATION RATE: 14 BRPM | WEIGHT: 165 LBS | HEIGHT: 62 IN

## 2024-02-08 DIAGNOSIS — L21.8 OTHER SEBORRHEIC DERMATITIS: ICD-10-CM

## 2024-02-08 DIAGNOSIS — L30.4 ERYTHEMA INTERTRIGO: ICD-10-CM

## 2024-02-08 DIAGNOSIS — L91.0 HYPERTROPHIC SCAR: ICD-10-CM

## 2024-02-08 PROCEDURE — OTHER COUNSELING: OTHER

## 2024-02-08 PROCEDURE — 99213 OFFICE O/P EST LOW 20 MIN: CPT

## 2024-02-08 PROCEDURE — OTHER ADDITIONAL NOTES: OTHER

## 2024-02-08 PROCEDURE — OTHER MIPS QUALITY: OTHER

## 2024-02-08 PROCEDURE — OTHER PRESCRIPTION: OTHER

## 2024-02-08 RX ORDER — TRIAMCINOLONE ACETONIDE 1 MG/G
CREAM TOPICAL BID
Qty: 80 | Refills: 1 | Status: ERX | COMMUNITY
Start: 2024-02-08

## 2024-02-08 RX ORDER — KETOCONAZOLE 20 MG/ML
SHAMPOO, SUSPENSION TOPICAL
Qty: 120 | Refills: 0 | Status: ERX | COMMUNITY
Start: 2024-02-08

## 2024-02-08 RX ORDER — NYSTATIN CREAM 100000 [USP'U]/G
CREAM TOPICAL BID
Qty: 30 | Refills: 2 | Status: ERX | COMMUNITY
Start: 2024-02-08

## 2024-02-08 ASSESSMENT — LOCATION ZONE DERM
LOCATION ZONE: SCALP
LOCATION ZONE: NECK
LOCATION ZONE: TRUNK

## 2024-02-08 ASSESSMENT — LOCATION DETAILED DESCRIPTION DERM
LOCATION DETAILED: MIDDLE STERNUM
LOCATION DETAILED: MID-OCCIPITAL SCALP
LOCATION DETAILED: LEFT INFERIOR ANTERIOR NECK
LOCATION DETAILED: LEFT MEDIAL SUPERIOR CHEST
LOCATION DETAILED: MID POSTERIOR NECK

## 2024-02-08 ASSESSMENT — LOCATION SIMPLE DESCRIPTION DERM
LOCATION SIMPLE: POSTERIOR SCALP
LOCATION SIMPLE: POSTERIOR NECK
LOCATION SIMPLE: LEFT ANTERIOR NECK
LOCATION SIMPLE: CHEST

## 2024-02-08 NOTE — HPI: SCAR
Is This A New Presentation, Or A Follow-Up?: Scar
Additional History: Previously excised cyst on 10/27/23 and is now raised and red. She applies aquaphor daily along with scar gel which she believes has helped a little.

## 2024-02-08 NOTE — PROCEDURE: ADDITIONAL NOTES
Additional Notes: She may use the triamcinolone cream on the scar. \\nDiscussed intralesional kenalog injection but patient deferred.
Render Risk Assessment In Note?: no
Detail Level: Simple
Additional Notes: She will use ketaconazole shampoo twice a week for this as well as intertrigo.

## 2024-02-08 NOTE — HPI: RASH
Is This A New Presentation, Or A Follow-Up?: Rash
Additional History: Started in December as a painful and red rash. She used ringworm cream for a month as an antifungal which helped a little. Once she stopped the cream, it worsened and spread. She notes burning when she applies lotion. She continues to use the ringworm cream.\\nShe takes hydroxyzine for anxiety and this has been helping the itching associated with her rash.

## 2024-02-20 ENCOUNTER — APPOINTMENT (OUTPATIENT)
Dept: URBAN - METROPOLITAN AREA CLINIC 253 | Age: 34
Setting detail: DERMATOLOGY
End: 2024-02-21

## 2024-02-20 VITALS — RESPIRATION RATE: 14 BRPM | WEIGHT: 165 LBS | HEIGHT: 62 IN

## 2024-02-20 DIAGNOSIS — Q826 OTHER SPECIFIED ANOMALIES OF SKIN: ICD-10-CM

## 2024-02-20 DIAGNOSIS — Q819 OTHER SPECIFIED ANOMALIES OF SKIN: ICD-10-CM

## 2024-02-20 DIAGNOSIS — L30.4 ERYTHEMA INTERTRIGO: ICD-10-CM

## 2024-02-20 DIAGNOSIS — Q828 OTHER SPECIFIED ANOMALIES OF SKIN: ICD-10-CM

## 2024-02-20 PROBLEM — L85.8 OTHER SPECIFIED EPIDERMAL THICKENING: Status: ACTIVE | Noted: 2024-02-20

## 2024-02-20 PROCEDURE — OTHER MIPS QUALITY: OTHER

## 2024-02-20 PROCEDURE — OTHER PRESCRIPTION MEDICATION MANAGEMENT: OTHER

## 2024-02-20 PROCEDURE — 99213 OFFICE O/P EST LOW 20 MIN: CPT

## 2024-02-20 PROCEDURE — OTHER PRESCRIPTION: OTHER

## 2024-02-20 PROCEDURE — OTHER COUNSELING: OTHER

## 2024-02-20 PROCEDURE — OTHER ADDITIONAL NOTES: OTHER

## 2024-02-20 RX ORDER — TERBINAFINE HYDROCHLORIDE 250 MG/1
TABLET ORAL
Qty: 14 | Refills: 0 | Status: ERX | COMMUNITY
Start: 2024-02-20

## 2024-02-20 ASSESSMENT — LOCATION DETAILED DESCRIPTION DERM
LOCATION DETAILED: RIGHT PROXIMAL POSTERIOR UPPER ARM
LOCATION DETAILED: RIGHT INFERIOR ANTERIOR NECK
LOCATION DETAILED: MID POSTERIOR NECK
LOCATION DETAILED: LEFT PROXIMAL POSTERIOR UPPER ARM
LOCATION DETAILED: UPPER STERNUM

## 2024-02-20 ASSESSMENT — LOCATION SIMPLE DESCRIPTION DERM
LOCATION SIMPLE: POSTERIOR NECK
LOCATION SIMPLE: LEFT POSTERIOR UPPER ARM
LOCATION SIMPLE: RIGHT POSTERIOR UPPER ARM
LOCATION SIMPLE: CHEST
LOCATION SIMPLE: RIGHT ANTERIOR NECK

## 2024-02-20 ASSESSMENT — LOCATION ZONE DERM
LOCATION ZONE: ARM
LOCATION ZONE: NECK
LOCATION ZONE: TRUNK

## 2024-02-20 NOTE — PROCEDURE: PRESCRIPTION MEDICATION MANAGEMENT
Render In Strict Bullet Format?: No
Continue Regimen: triamcinolone acetonide 0.1 % topical cream Bid\\nnystatin 100,000 unit/gram topical cream BID\\nketoconazole 2 % shampoo twice a week.
Detail Level: Zone

## 2024-02-20 NOTE — PROCEDURE: ADDITIONAL NOTES
Render Risk Assessment In Note?: no
Detail Level: Simple
Additional Notes: Recommended she use Differin gel and amlactin lotion on her face and arms. \\nSample of amlactin with coupon given today.

## 2024-03-01 ENCOUNTER — VIRTUAL VISIT (OUTPATIENT)
Dept: INTERNAL MEDICINE | Facility: CLINIC | Age: 34
End: 2024-03-01
Payer: COMMERCIAL

## 2024-03-01 DIAGNOSIS — F33.1 MODERATE EPISODE OF RECURRENT MAJOR DEPRESSIVE DISORDER (H): ICD-10-CM

## 2024-03-01 DIAGNOSIS — M54.50 CHRONIC MIDLINE LOW BACK PAIN WITHOUT SCIATICA: Primary | ICD-10-CM

## 2024-03-01 DIAGNOSIS — G89.29 CHRONIC MIDLINE LOW BACK PAIN WITHOUT SCIATICA: Primary | ICD-10-CM

## 2024-03-01 PROCEDURE — 99214 OFFICE O/P EST MOD 30 MIN: CPT | Mod: 95 | Performed by: INTERNAL MEDICINE

## 2024-03-01 RX ORDER — BUPROPION HYDROCHLORIDE 150 MG/1
150 TABLET ORAL EVERY MORNING
COMMUNITY

## 2024-03-01 RX ORDER — CYCLOBENZAPRINE HCL 10 MG
10 TABLET ORAL 3 TIMES DAILY PRN
Qty: 30 TABLET | Refills: 0 | Status: SHIPPED | OUTPATIENT
Start: 2024-03-01 | End: 2024-04-19

## 2024-03-01 NOTE — PROGRESS NOTES
"Fiona is a 33 year old who is being evaluated via a billable video visit.      How would you like to obtain your AVS? MyChart  If the video visit is dropped, the invitation should be resent by: Text to cell phone: [phone]  Will anyone else be joining your video visit? No          Assessment & Plan     Chronic midline low back pain without sciatica  Has been seen by the pain medicine team before with recommendation for BABITA in 2019.  Patient opted for the conservative route with medication management with gabapentin.  Not on gabapentin anymore.  Recurrence of pain with muscle spasms.  Flexeril medication provided to be used as needed 3 times daily for muscle spasms.  Side effect profile discussed with the patient.  Continue using NSAID medications as needed for pain symptoms.  We discussed on an orthopedic/sports medicine referral for further evaluation.  Referral placed.  - Orthopedic  Referral; Future  - cyclobenzaprine (FLEXERIL) 10 MG tablet; Take 1 tablet (10 mg) by mouth 3 times daily as needed for muscle spasms    Moderate episode of recurrent major depressive disorder (H)  Medications as managed by the psychiatry team.            BMI  Estimated body mass index is 28.52 kg/m  as calculated from the following:    Height as of 8/17/23: 1.6 m (5' 3\").    Weight as of 8/17/23: 73 kg (161 lb).             Subjective   Fiona is a 33 year old, presenting for the following health issues:  Back Pain        3/1/2024     9:24 AM   Additional Questions   Roomed by Floyd   Accompanied by Self     History of Present Illness       Back Pain:  She presents for follow up of back pain. Patient's back pain is a recurring problem.  Location of back pain:  Right lower back and left shoulder  Description of back pain: dull ache and gnawing  Back pain spreads: right buttocks, left buttocks and left shoulder    Since patient first noticed back pain, pain is: gradually worsening  Does back pain interfere with her job:  " No       She eats 2-3 servings of fruits and vegetables daily.She consumes 0 sweetened beverage(s) daily.She exercises with enough effort to increase her heart rate 10 to 19 minutes per day.  She exercises with enough effort to increase her heart rate 5 days per week.   She is taking medications regularly.               Review of Systems  Constitutional, HEENT, cardiovascular, pulmonary, gi and gu systems are negative, except as otherwise noted.      Objective           Vitals:  No vitals were obtained today due to virtual visit.    Physical Exam   GENERAL: alert and no distress  EYES: Eyes grossly normal to inspection.  No discharge or erythema, or obvious scleral/conjunctival abnormalities.  RESP: No audible wheeze, cough, or visible cyanosis.    SKIN: Visible skin clear. No significant rash, abnormal pigmentation or lesions.  NEURO: Cranial nerves grossly intact.  Mentation and speech appropriate for age.  PSYCH: Appropriate affect, tone, and pace of words          Video-Visit Details    Type of service:  Video Visit     Originating Location (pt. Location): Home    Distant Location (provider location):  On-site  Platform used for Video Visit: Kari  Signed Electronically by: Domi Alvarez MD

## 2024-04-12 SDOH — HEALTH STABILITY: PHYSICAL HEALTH: ON AVERAGE, HOW MANY DAYS PER WEEK DO YOU ENGAGE IN MODERATE TO STRENUOUS EXERCISE (LIKE A BRISK WALK)?: 5 DAYS

## 2024-04-12 SDOH — HEALTH STABILITY: PHYSICAL HEALTH: ON AVERAGE, HOW MANY MINUTES DO YOU ENGAGE IN EXERCISE AT THIS LEVEL?: 30 MIN

## 2024-04-12 ASSESSMENT — SOCIAL DETERMINANTS OF HEALTH (SDOH): HOW OFTEN DO YOU GET TOGETHER WITH FRIENDS OR RELATIVES?: ONCE A WEEK

## 2024-04-15 ENCOUNTER — OFFICE VISIT (OUTPATIENT)
Dept: ANESTHESIOLOGY | Facility: CLINIC | Age: 34
End: 2024-04-15
Attending: INTERNAL MEDICINE
Payer: COMMERCIAL

## 2024-04-15 VITALS — HEART RATE: 102 BPM | OXYGEN SATURATION: 99 % | SYSTOLIC BLOOD PRESSURE: 107 MMHG | DIASTOLIC BLOOD PRESSURE: 76 MMHG

## 2024-04-15 DIAGNOSIS — M54.50 CHRONIC MIDLINE LOW BACK PAIN WITHOUT SCIATICA: ICD-10-CM

## 2024-04-15 DIAGNOSIS — G89.29 CHRONIC MIDLINE LOW BACK PAIN WITHOUT SCIATICA: ICD-10-CM

## 2024-04-15 PROCEDURE — 99203 OFFICE O/P NEW LOW 30 MIN: CPT | Mod: GC | Performed by: ANESTHESIOLOGY

## 2024-04-15 ASSESSMENT — PAIN SCALES - GENERAL: PAINLEVEL: MILD PAIN (2)

## 2024-04-15 NOTE — LETTER
4/15/2024       RE: Fiona Henry  [address]  [facility] [address]       Dear Colleague,    Thank you for referring your patient, Fiona Henry, to the [facility] CLIN[facility] FOR COMPREHENSIVE PAIN MANAGEMENT [place] at [facility]. Please see a copy of my visit note below.      Pain Clinic New Patient Consult Note:    Referring Provider: Antonio   Primary care provider: Vandana Castillo.    Fiona Henry is a 33 year old y.o. old female w/ a h/o hypothyroid, anxiety and depression who presents to the pain clinic with chronic low back pain. She has been previously seen by Dr. Troy in our clinic as well in 2019 for same.    HPI:  Patient Supplied Answers To the  Pain Questionnaire      4/8/2024     8:38 AM    Pain -  Patient Entered Questionnaire/Answers   What number best describes your pain right now:  0 = No pain  to  10 = Worst pain imaginable 1   How would you describe the pain dull, aching   Which of the following worsen your pain sitting   Which of the following improve or reduce your pain lying down    walking    exercise    medication   What number best describes your average pain for the past week:  0 = No pain  to  10 = Worst pain imaginable 3   What number best describes your LOWEST pain in past 24 hours:  0 = No pain  to  10 = Worst pain imaginable 0   What number best describes your WORST pain in past 24 hours:  0 = No pain  to  10 = Worst pain imaginable 1   When is your pain worst AM   What non-medicine treatments have you already had for your pain physical therapy    chiropractic care    exercise     She originally had low back pain intermittently over the last 15 years. Episodes are typically self limited. Did have severe flares. This episode was self limited after an ED visit with a medrol dosepak, methocarbamol and lidocaine patches. Then had another similar episode of pain a few months later and was referred to Dr. Troy who  saw her in the summer of 2019. At that time pain seemed c/w R L5 radiculopathy and was referred for PT and started on gabapentin. BABITA was discussed but never completed as she improved with PT. Since that time did continue to have intermittent flares of low back pain but nothing this severe. At this point continues to have sporadic increases in pain lasting a day to a week depending on activity levels, long trips, etc. Today is not too bad of a day.     Her typical pain is located in the lumbosacral region but can also be in the interscapular area as well. Typically is a dull/achy pain which is worse with bending/twisting and prolonged sitting. No longer having shooting pains into the BLE, numbness or weakness. No bowel or bladder incontinence, weight changes, night sweats, fevers.      Pain treatments:  Physical therapy: Yes  Chiropractor: Yes  Pain physician: Yes  Surgery: No  Biofeedback: No  Acupuncture: No    Tests/Imaging reviewed with the patient:   L-spine 06/2019:   IMPRESSION: Single level degenerative changes with central disc  protrusion at L4-L5 asymmetric towards the right which may slightly  compress the traversing right S1 nerve root in the lateral recess.  Other disc levels appear normal. Otherwise normal MRI of the lumbar  spine.    Significant Medical History:   Past Medical History:   Diagnosis Date    Anxiety     Depressive disorder Life long    Consistently goes to psychiatrist for the past 10 years    Hashimoto's thyroiditis           Past Surgical History:  Past Surgical History:   Procedure Laterality Date    adnoidectomy Bilateral     tonsillectomy Bilateral           Family History:  Family History   Problem Relation Age of Onset    Bipolar Disorder Mother     Depression Mother         Major depressive disorder, sometimes hospitalized and treated with ECT    Diabetes Father         Type II    Heart Disease Father         Hx Triple Bypass    Depression Maternal Grandfather         Bipolar,  hospitalized sometimes, teeated with ECT    Other Cancer Paternal Grandfather         Lung cancer and heart attacks    Diabetes Paternal Grandfather         Type II          Social History:  Social History     Socioeconomic History    Marital status:      Spouse name: Not on file    Number of children: Not on file    Years of education: Not on file    Highest education level: Not on file   Occupational History    Not on file   Tobacco Use    Smoking status: Never    Smokeless tobacco: Never   Vaping Use    Vaping status: Never Used   Substance and Sexual Activity    Alcohol use: Not Currently     Comment: Rarely    Drug use: No    Sexual activity: Yes     Partners: Male     Birth control/protection: None   Other Topics Concern    Not on file   Social History Narrative    Not on file     Social Determinants of Health     Financial Resource Strain: Low Risk  (4/12/2024)    Financial Resource Strain     Within the past 12 months, have you or your family members you live with been unable to get utilities (heat, electricity) when it was really needed?: No   Food Insecurity: Low Risk  (4/12/2024)    Food Insecurity     Within the past 12 months, did you worry that your food would run out before you got money to buy more?: No     Within the past 12 months, did the food you bought just not last and you didn t have money to get more?: No   Transportation Needs: Low Risk  (4/12/2024)    Transportation Needs     Within the past 12 months, has lack of transportation kept you from medical appointments, getting your medicines, non-medical meetings or appointments, work, or from getting things that you need?: No   Physical Activity: Sufficiently Active (4/12/2024)    Exercise Vital Sign     Days of Exercise per Week: 5 days     Minutes of Exercise per Session: 30 min   Stress: No Stress Concern Present (4/12/2024)    [employer] - Occupational Stress Questionnaire     Feeling of Stress : Only a  little   Social Connections: Unknown (4/12/2024)    Social Connection and Isolation Panel [NHANES]     Frequency of Communication with Friends and Family: Not on file     Frequency of Social Gatherings with Friends and Family: Once a week     Attends Cheondoism Services: Not on file     Active Member of Clubs or Organizations: Not on file     Attends Club or Organization Meetings: Not on file     Marital Status: Not on file   Interpersonal Safety: Not on file   Housing Stability: Low Risk  (4/12/2024)    Housing Stability     Do you have housing? : Yes     Are you worried about losing your housing?: No     Social History     Social History Narrative    Not on file          Allergies:  No Known Allergies    Current Medications:   Current Outpatient Medications   Medication Sig Dispense Refill    buPROPion (WELLBUTRIN XL) 150 MG 24 hr tablet Take 150 mg by mouth every morning      cyclobenzaprine (FLEXERIL) 10 MG tablet Take 1 tablet (10 mg) by mouth 3 times daily as needed for muscle spasms 30 tablet 0    metFORMIN (GLUCOPHAGE) 500 MG tablet Take 500 mg by mouth 2 times daily (with meals)      prazosin (MINIPRESS) 2 MG capsule Take 2 mg by mouth            Current Pain Medications:  - Cyclobenzaprine- helpful    Past Pain Medications:  Gabapentin    Blood thinner:  None    Work History:    Current work status: Full time, childrens     Review of Systems:  ROS      Physical Exam:     There were no vitals filed for this visit.    General Appearance: No distress, seated comfortably  Mood: Euthymic  HE ENT: Non constricted pupils  Respiratory: Non labored breathing  CVS: Regular rate and rhythm  GI: Soft, non distended, no TTP  Skin: No rashes over exposed skin    Neuro:   Gait: non-antalgic, ambulates without assistance    Lumbar Spine , SIJ & Hip Exam:    Gait: Non antalgic    Inspection:   Scar: None  Posture:    Palpation:   Mild TTP over lumbar paraspinal musculature. Non TTP over the lumbar spinous  "processes, SIJ, gluteal/piriformis musculature, greater trochanters.     Lumbar Spine ROM  Flexion (70-90 degree): WNL  Extension (20-30 degree): WNL  Left lateral bend (25-35 degree): WNL  Right side bend (25-35 degree): WNL  Rotation left (30-40 degrees): WNL  Rotation right (30-40 degrees): WNL        Specific Exam:                     Right          Left  Extension/Twist:                       negative     negative  Supine limb roll:                        No pain       No pain  MARQUITA:                                     negative     negative  FADIR:                                      negative     negative    Nerve tension sign                   Right           Left  SLR/slump:                             negative     negative  Reverse SLR:                         negative     negative    Strength (in sitting)                 Right        Left  Hip Flexion (L1-2)                      5/5           5/5  Knee Extension (L3)                  5/5           5/5  Ankle Dorsiflexion (L4)               5/5           5/5  Ankle Plantarflexion                   5/5           5/5  1st MTP Extension (L5)             5/5           5/5  Ankle Eversion (S1)                   5/5           5/5      Sensation:    Right lower limb: Sensation intact to light touch over all dermatomes  Left lower limb : Sensation intact to light touch over all dermatomes      Reflexes:                          Right        Left  Patellar                               2/4          2/4  Achilles                               2/4          2/4  Babinski                           Absent    Absent  Clonus                             Absent    Absent         Laboratory results:  Recent Labs   Lab Test 02/08/19  1230   GLC 84       CBC RESULTS: No results for input(s): \"WBC\", \"RBC\", \"HGB\", \"HCT\", \"MCV\", \"MCH\", \"MCHC\", \"RDW\", \"PLT\" in the last 80613 hours.      Imaging:       ASSESSMENT AND PLAN:     Encounter Diagnosis:  # Myofascial pain  # Discogenic " pain  # Chronic low back pain    Fiona Henry is a 33 year old y.o. old female who presents to the pain clinic with intermittent flares of low back pain over the last 15 years, usually brought on by increased activity or prolonged sitting like on a trip. Today she has no neurologic deficits or s/s concerning for radiculopathy or myelopathy. Symptoms are most c/w myofascial pain flares though may have some discogenic component given her disc bulge seen in 2019. Continues with her HEP. Finds flexeril helpful.    I have summarized the patient s past medical history, discussed their clinical findings and the potential differential diagnosis with the patient. The differential diagnosis discussed with the patient are listed above. I have discussed anatomy and possible sources of the pain using models and/or pictures (diagrams). I have discussed multi- disciplinary pain management options withthe patient as pertaining to their case as detailed above. The pain management options we discussed included, but were not limited to the recommendations below.  I also discussed with patient the risks, benefits and alternatives to each pain management option.  All of the patient s questions and concerns were answered to the best of my ability.    RECOMMENDATIONS:     1. Medications: PCP ok to continue to provide PRN scripts for cyclobenzaprine. Dosing, side effects, risks/benefits/alternatives were discussed with the patient in detail.    2. Procedure: None today. In the future if having a severe flare could consider BABITA.    3. Physical therapy: Continue HEP. Discussed new PT referral today but patient states she is good with current program. PCP can refer back to PT if having a worse flare of pain.      4. Discussed myofascial treatments such as theracane and theraguns.     Follow up: As needed.     Amadeo Kate MD  Pain Fellow Physician  [facility]       Attestation signed by Leeann Mclean MD at 4/24/2024   8:26 AM:  ATTENDING ATTESTATION  I saw the patient along with the pain medicine fellow Dr. Amadeo Kate. Please see his note above for full details. I was involved in gathering history, physical examination and development of the plan of care.         Again, thank you for allowing me to participate in the care of your patient.      Sincerely,    Leeann Mclean MD

## 2024-04-15 NOTE — PATIENT INSTRUCTIONS
Treatment planning:    Recommendations will be written in the providers note for your Primary Care provider (OR other providers in your care team) to review and make changes to your therapies based on their discretion.         To speak with a nurse, schedule/reschedule/cancel a clinic appointment, or request a medication refill call: [phone]    You can also reach us by [employer]: [email or web address]

## 2024-04-15 NOTE — NURSING NOTE
Patient presents with:  Consult: Consult Mid-Lower back Pain Review for Flexeril RX      Mild Pain (2)         What medications are you using for pain? Flexeril, Aleve    (New patients only) Have you been seen by another pain clinic/ provider? yesn    (Return Patients only) What refills are you needing today? No    Expectations Review for and RX for Flexeril

## 2024-04-15 NOTE — PROGRESS NOTES
Pain Clinic New Patient Consult Note:    Referring Provider: Antonio   Primary care provider: Vandana Castillo.    Fiona Henry is a 33 year old y.o. old female w/ a h/o hypothyroid, anxiety and depression who presents to the pain clinic with chronic low back pain. She has been previously seen by Dr. Troy in our clinic as well in 2019 for same.    HPI:  Patient Supplied Answers To the  Pain Questionnaire      4/8/2024     8:38 AM    Pain -  Patient Entered Questionnaire/Answers   What number best describes your pain right now:  0 = No pain  to  10 = Worst pain imaginable 1   How would you describe the pain dull, aching   Which of the following worsen your pain sitting   Which of the following improve or reduce your pain lying down    walking    exercise    medication   What number best describes your average pain for the past week:  0 = No pain  to  10 = Worst pain imaginable 3   What number best describes your LOWEST pain in past 24 hours:  0 = No pain  to  10 = Worst pain imaginable 0   What number best describes your WORST pain in past 24 hours:  0 = No pain  to  10 = Worst pain imaginable 1   When is your pain worst AM   What non-medicine treatments have you already had for your pain physical therapy    chiropractic care    exercise     She originally had low back pain intermittently over the last 15 years. Episodes are typically self limited. Did have severe flares. This episode was self limited after an ED visit with a medrol dosepak, methocarbamol and lidocaine patches. Then had another similar episode of pain a few months later and was referred to Dr. Troy who saw her in the summer of 2019. At that time pain seemed c/w R L5 radiculopathy and was referred for PT and started on gabapentin. BABITA was discussed but never completed as she improved with PT. Since that time did continue to have intermittent flares of low back pain but nothing this severe. At this point continues to have sporadic increases  in pain lasting a day to a week depending on activity levels, long trips, etc. Today is not too bad of a day.     Her typical pain is located in the lumbosacral region but can also be in the interscapular area as well. Typically is a dull/achy pain which is worse with bending/twisting and prolonged sitting. No longer having shooting pains into the BLE, numbness or weakness. No bowel or bladder incontinence, weight changes, night sweats, fevers.      Pain treatments:  Physical therapy: Yes  Chiropractor: Yes  Pain physician: Yes  Surgery: No  Biofeedback: No  Acupuncture: No    Tests/Imaging reviewed with the patient:  MR MORALES-spine 06/2019:   IMPRESSION: Single level degenerative changes with central disc  protrusion at L4-L5 asymmetric towards the right which may slightly  compress the traversing right S1 nerve root in the lateral recess.  Other disc levels appear normal. Otherwise normal MRI of the lumbar  spine.    Significant Medical History:   Past Medical History:   Diagnosis Date    Anxiety     Depressive disorder Life long    Consistently goes to psychiatrist for the past 10 years    Hashimoto's thyroiditis           Past Surgical History:  Past Surgical History:   Procedure Laterality Date    adnoidectomy Bilateral     tonsillectomy Bilateral           Family History:  Family History   Problem Relation Age of Onset    Bipolar Disorder Mother     Depression Mother         Major depressive disorder, sometimes hospitalized and treated with ECT    Diabetes Father         Type II    Heart Disease Father         Hx Triple Bypass    Depression Maternal Grandfather         Bipolar, hospitalized sometimes, teeated with ECT    Other Cancer Paternal Grandfather         Lung cancer and heart attacks    Diabetes Paternal Grandfather         Type II          Social History:  Social History     Socioeconomic History    Marital status:      Spouse name: Not on file    Number of children: Not on file    Years of  education: Not on file    Highest education level: Not on file   Occupational History    Not on file   Tobacco Use    Smoking status: Never    Smokeless tobacco: Never   Vaping Use    Vaping status: Never Used   Substance and Sexual Activity    Alcohol use: Not Currently     Comment: Rarely    Drug use: No    Sexual activity: Yes     Partners: Male     Birth control/protection: None   Other Topics Concern    Not on file   Social History Narrative    Not on file     Social Determinants of Health     Financial Resource Strain: Low Risk  (4/12/2024)    Financial Resource Strain     Within the past 12 months, have you or your family members you live with been unable to get utilities (heat, electricity) when it was really needed?: No   Food Insecurity: Low Risk  (4/12/2024)    Food Insecurity     Within the past 12 months, did you worry that your food would run out before you got money to buy more?: No     Within the past 12 months, did the food you bought just not last and you didn t have money to get more?: No   Transportation Needs: Low Risk  (4/12/2024)    Transportation Needs     Within the past 12 months, has lack of transportation kept you from medical appointments, getting your medicines, non-medical meetings or appointments, work, or from getting things that you need?: No   Physical Activity: Sufficiently Active (4/12/2024)    Exercise Vital Sign     Days of Exercise per Week: 5 days     Minutes of Exercise per Session: 30 min   Stress: No Stress Concern Present (4/12/2024)    [employer] [place] [employer] - Occupational Stress Questionnaire     Feeling of Stress : Only a little   Social Connections: Unknown (4/12/2024)    Social Connection and Isolation Panel [NHANES]     Frequency of Communication with Friends and Family: Not on file     Frequency of Social Gatherings with Friends and Family: Once a week     Attends Sabianist Services: Not on file     Active Member of Clubs or Organizations: Not on  file     Attends Club or Organization Meetings: Not on file     Marital Status: Not on file   Interpersonal Safety: Not on file   Housing Stability: Low Risk  (4/12/2024)    Housing Stability     Do you have housing? : Yes     Are you worried about losing your housing?: No     Social History     Social History Narrative    Not on file          Allergies:  No Known Allergies    Current Medications:   Current Outpatient Medications   Medication Sig Dispense Refill    buPROPion (WELLBUTRIN XL) 150 MG 24 hr tablet Take 150 mg by mouth every morning      cyclobenzaprine (FLEXERIL) 10 MG tablet Take 1 tablet (10 mg) by mouth 3 times daily as needed for muscle spasms 30 tablet 0    metFORMIN (GLUCOPHAGE) 500 MG tablet Take 500 mg by mouth 2 times daily (with meals)      prazosin (MINIPRESS) 2 MG capsule Take 2 mg by mouth            Current Pain Medications:  - Cyclobenzaprine- helpful    Past Pain Medications:  Gabapentin    Blood thinner:  None    Work History:    Current work status: Full time, childrens     Review of Systems:  ROS      Physical Exam:     There were no vitals filed for this visit.    General Appearance: No distress, seated comfortably  Mood: Euthymic  HE ENT: Non constricted pupils  Respiratory: Non labored breathing  CVS: Regular rate and rhythm  GI: Soft, non distended, no TTP  Skin: No rashes over exposed skin    Neuro:   Gait: non-antalgic, ambulates without assistance    Lumbar Spine , SIJ & Hip Exam:    Gait: Non antalgic    Inspection:   Scar: None  Posture:    Palpation:   Mild TTP over lumbar paraspinal musculature. Non TTP over the lumbar spinous processes, SIJ, gluteal/piriformis musculature, greater trochanters.     Lumbar Spine ROM  Flexion (70-90 degree): WNL  Extension (20-30 degree): WNL  Left lateral bend (25-35 degree): WNL  Right side bend (25-35 degree): WNL  Rotation left (30-40 degrees): WNL  Rotation right (30-40 degrees): WNL        Specific Exam:                      "Right          Left  Extension/Twist:                       negative     negative  Supine limb roll:                        No pain       No pain  MARQUITA:                                     negative     negative  FADIR:                                      negative     negative    Nerve tension sign                   Right           Left  SLR/slump:                             negative     negative  Reverse SLR:                         negative     negative    Strength (in sitting)                 Right        Left  Hip Flexion (L1-2)                      5/5           5/5  Knee Extension (L3)                  5/5           5/5  Ankle Dorsiflexion (L4)               5/5           5/5  Ankle Plantarflexion                   5/5           5/5  1st MTP Extension (L5)             5/5           5/5  Ankle Eversion (S1)                   5/5           5/5      Sensation:    Right lower limb: Sensation intact to light touch over all dermatomes  Left lower limb : Sensation intact to light touch over all dermatomes      Reflexes:                          Right        Left  Patellar                               2/4          2/4  Achilles                               2/4          2/4  Babinski                           Absent    Absent  Clonus                             Absent    Absent         Laboratory results:  Recent Labs   Lab Test 02/08/19  1230   GLC 84       CBC RESULTS: No results for input(s): \"WBC\", \"RBC\", \"HGB\", \"HCT\", \"MCV\", \"MCH\", \"MCHC\", \"RDW\", \"PLT\" in the last 17874 hours.      Imaging:       ASSESSMENT AND PLAN:     Encounter Diagnosis:  # Myofascial pain  # Discogenic pain  # Chronic low back pain    Fiona Henry is a 33 year old y.o. old female who presents to the pain clinic with intermittent flares of low back pain over the last 15 years, usually brought on by increased activity or prolonged sitting like on a trip. Today she has no neurologic deficits or s/s concerning for radiculopathy or " myelopathy. Symptoms are most c/w myofascial pain flares though may have some discogenic component given her disc bulge seen in 2019. Continues with her HEP. Finds flexeril helpful.    I have summarized the patient s past medical history, discussed their clinical findings and the potential differential diagnosis with the patient. The differential diagnosis discussed with the patient are listed above. I have discussed anatomy and possible sources of the pain using models and/or pictures (diagrams). I have discussed multi- disciplinary pain management options withthe patient as pertaining to their case as detailed above. The pain management options we discussed included, but were not limited to the recommendations below.  I also discussed with patient the risks, benefits and alternatives to each pain management option.  All of the patient s questions and concerns were answered to the best of my ability.    RECOMMENDATIONS:     1. Medications: PCP ok to continue to provide PRN scripts for cyclobenzaprine. Dosing, side effects, risks/benefits/alternatives were discussed with the patient in detail.    2. Procedure: None today. In the future if having a severe flare could consider BABITA.    3. Physical therapy: Continue HEP. Discussed new PT referral today but patient states she is good with current program. PCP can refer back to PT if having a worse flare of pain.      4. Discussed myofascial treatments such as theracane and theraguns.     Follow up: As needed.     Amadeo Kate MD  Pain Fellow Physician  [facility]

## 2024-04-19 ENCOUNTER — OFFICE VISIT (OUTPATIENT)
Dept: FAMILY MEDICINE | Facility: CLINIC | Age: 34
End: 2024-04-19
Payer: COMMERCIAL

## 2024-04-19 VITALS
TEMPERATURE: 98.2 F | WEIGHT: 159.7 LBS | HEIGHT: 63 IN | BODY MASS INDEX: 28.3 KG/M2 | RESPIRATION RATE: 20 BRPM | DIASTOLIC BLOOD PRESSURE: 70 MMHG | SYSTOLIC BLOOD PRESSURE: 110 MMHG | HEART RATE: 87 BPM | OXYGEN SATURATION: 98 %

## 2024-04-19 DIAGNOSIS — E06.3 HYPOTHYROIDISM DUE TO HASHIMOTO'S THYROIDITIS: ICD-10-CM

## 2024-04-19 DIAGNOSIS — M54.50 CHRONIC MIDLINE LOW BACK PAIN WITHOUT SCIATICA: ICD-10-CM

## 2024-04-19 DIAGNOSIS — E28.2 PCOS (POLYCYSTIC OVARIAN SYNDROME): ICD-10-CM

## 2024-04-19 DIAGNOSIS — G89.29 CHRONIC MIDLINE LOW BACK PAIN WITHOUT SCIATICA: ICD-10-CM

## 2024-04-19 DIAGNOSIS — R07.0 THROAT PAIN: ICD-10-CM

## 2024-04-19 DIAGNOSIS — K13.70 MOUTH LESION: ICD-10-CM

## 2024-04-19 DIAGNOSIS — O03.9 MISCARRIAGE: ICD-10-CM

## 2024-04-19 DIAGNOSIS — F33.1 MODERATE EPISODE OF RECURRENT MAJOR DEPRESSIVE DISORDER (H): ICD-10-CM

## 2024-04-19 DIAGNOSIS — Z00.00 ROUTINE GENERAL MEDICAL EXAMINATION AT A HEALTH CARE FACILITY: ICD-10-CM

## 2024-04-19 DIAGNOSIS — Z11.4 SCREENING FOR HIV (HUMAN IMMUNODEFICIENCY VIRUS): ICD-10-CM

## 2024-04-19 DIAGNOSIS — J02.9 ACUTE VIRAL PHARYNGITIS: ICD-10-CM

## 2024-04-19 DIAGNOSIS — Z11.59 NEED FOR HEPATITIS C SCREENING TEST: ICD-10-CM

## 2024-04-19 PROBLEM — F51.01 PRIMARY INSOMNIA: Status: RESOLVED | Noted: 2019-02-08 | Resolved: 2024-04-19

## 2024-04-19 LAB
DEPRECATED S PYO AG THROAT QL EIA: NEGATIVE
GROUP A STREP BY PCR: NOT DETECTED

## 2024-04-19 PROCEDURE — 99395 PREV VISIT EST AGE 18-39: CPT | Performed by: INTERNAL MEDICINE

## 2024-04-19 PROCEDURE — 87651 STREP A DNA AMP PROBE: CPT | Performed by: INTERNAL MEDICINE

## 2024-04-19 RX ORDER — CYCLOBENZAPRINE HCL 10 MG
10 TABLET ORAL 3 TIMES DAILY PRN
Qty: 30 TABLET | Refills: 0 | Status: SHIPPED | OUTPATIENT
Start: 2024-04-19

## 2024-04-19 RX ORDER — TERBINAFINE HYDROCHLORIDE 250 MG/1
TABLET ORAL
COMMUNITY
Start: 2024-02-20 | End: 2024-04-19

## 2024-04-19 RX ORDER — LETROZOLE 2.5 MG/1
TABLET, FILM COATED ORAL
COMMUNITY
Start: 2024-03-01 | End: 2024-08-22

## 2024-04-19 RX ORDER — MEDROXYPROGESTERONE ACETATE 10 MG
1 TABLET ORAL
COMMUNITY
Start: 2024-04-05 | End: 2024-04-19

## 2024-04-19 RX ORDER — TRIAMCINOLONE ACETONIDE 1 MG/G
CREAM TOPICAL
COMMUNITY
Start: 2024-03-22 | End: 2024-04-19

## 2024-04-19 RX ORDER — HYDROXYZINE HYDROCHLORIDE 10 MG/1
10 TABLET, FILM COATED ORAL 2 TIMES DAILY PRN
COMMUNITY
Start: 2024-03-22

## 2024-04-19 RX ORDER — KETOCONAZOLE 20 MG/ML
SHAMPOO TOPICAL
COMMUNITY
Start: 2024-02-08 | End: 2024-04-19

## 2024-04-19 RX ORDER — CHORIOGONADOTROPIN ALFA 250 UG/.5ML
INJECTION, SOLUTION SUBCUTANEOUS
COMMUNITY
Start: 2024-03-01

## 2024-04-19 ASSESSMENT — PAIN SCALES - GENERAL: PAINLEVEL: MODERATE PAIN (4)

## 2024-04-19 ASSESSMENT — ENCOUNTER SYMPTOMS: SORE THROAT: 1

## 2024-04-19 NOTE — PROGRESS NOTES
"Preventive Care Visit  [facility]  Caylamarcelino Reese DO, Internal Medicine  Apr 19, 2024      Assessment & Plan     (Z00.00) Routine general medical examination at a health care facility  Comment:   Pap: 5/11/22 NIL, HPV negative (may have had another one at [facility])  Immunizations: HBV- she is sure she had this in adolescence  Labs: Lipids, diabetes screen- done at [facility], will request records  Discussed healthy lifestyle and aging recommendations including regular exercise, adequate and regular sleep, 5+ fruits and veggies daily.    (M54.50,  G89.29) Chronic midline low back pain without sciatica  Comment: Longstanding with intermittent flares, treated with Flexeril prn.  Plan: cyclobenzaprine (FLEXERIL) 10 MG tablet    (K13.70) Mouth lesion  Comment: Posterior oropharynx with pink colored polyp, only visit with raising of soft tissue palate- doesn't appear precancerous but recommend ENT eval.  Plan: Adult ENT  Referral    (J02.9) Acute viral pharyngitis  (R07.0) Throat pain  Comment: Supportive cares, Strep prelim testing negative.  Plan: Streptococcus A Rapid Screen w/Reflex to PCR -         Clinic Collect, Group A Streptococcus PCR         Throat Swab, Adult ENT  Referral    (F33.1) Moderate episode of recurrent major depressive disorder (H)  Comment: Managed with bupropion 150 mg daily prescribed by Psychiatrist.    (E03.8,  E06.3) Hypothyroidism due to Hashimoto's thyroiditis  Comment: Recent thyroid hormones normal, should have TSH with reflex checked annually.    (O03.9) Miscarriage  (E28.2) PCOS (polycystic ovarian syndrome)  Comment: Working with fertility clinic- [facility].    Patient has been advised of split billing requirements and indicates understanding: Yes        BMI  Estimated body mass index is 28.38 kg/m  as calculated from the following:    Height as of this encounter: 1.598 m (5' 2.9\").    Weight as of this encounter: 72.4 " kg (159 lb 11.2 oz).       Counseling  Appropriate preventive services were discussed with this patient, including applicable screening as appropriate for fall prevention, nutrition, physical activity, Tobacco-use cessation, weight loss and cognition.  Checklist reviewing preventive services available has been given to the patient.  Reviewed patient's diet, addressing concerns and/or questions.   She is at risk for psychosocial distress and has been provided with information to reduce risk.       Patient Instructions   Preventive Care Advice   This is general advice given by our system to help you stay healthy. However, your care team may have specific advice just for you. Please talk to your care team about your preventive care needs.  Nutrition  Eat 5 or more servings of fruits and vegetables each day.  Try wheat bread, brown rice and whole grain pasta (instead of white bread, rice, and pasta).  Get enough calcium and vitamin D. Check the label on foods and aim for 100% of the RDA (recommended daily allowance).  Lifestyle  Exercise at least 150 minutes each week   (30 minutes a day, 5 days a week).  Do muscle strengthening activities 2 days a week. These help control your weight and prevent disease.  No smoking.  Wear sunscreen to prevent skin cancer.  Have a dental exam and cleaning every 6 months.  Yearly exams  See your health care team every year to talk about:  Any changes in your health.  Any medicines your care team has prescribed.  Preventive care, family planning, and ways to prevent chronic diseases.  Shots (vaccines)   HPV shots (up to age 26), if you've never had them before.  Hepatitis B shots (up to age 59), if you've never had them before.  COVID-19 shot: Get this shot when it's due.  Flu shot: Get a flu shot every year.  Tetanus shot: Get a tetanus shot every 10 years.  Pneumococcal, hepatitis A, and RSV shots: Ask your care team if you need these based on your risk.  Shingles shot (for age 50 and  up).  General health tests  Diabetes screening:  Starting at age 35, Get screened for diabetes at least every 3 years.  If you are younger than age 35, ask your care team if you should be screened for diabetes.  Cholesterol test: At age 39, start having a cholesterol test every 5 years, or more often if advised.  Bone density scan (DEXA): At age 50, ask your care team if you should have this scan for osteoporosis (brittle bones).  Hepatitis C: Get tested at least once in your life.  STIs (sexually transmitted infections)  Before age 24: Ask your care team if you should be screened for STIs.  After age 24: Get screened for STIs if you're at risk. You are at risk for STIs (including HIV) if:  You are sexually active with more than one person.  You don't use condoms every time.  You or a partner was diagnosed with a sexually transmitted infection.  If you are at risk for HIV, ask about PrEP medicine to prevent HIV.  Get tested for HIV at least once in your life, whether you are at risk for HIV or not.  Cancer screening tests  Cervical cancer screening: If you have a cervix, begin getting regular cervical cancer screening tests at age 21. Most people who have regular screenings with normal results can stop after age 65. Talk about this with your provider.  Breast cancer scan (mammogram): If you've ever had breasts, begin having regular mammograms starting at age 40. This is a scan to check for breast cancer.  Colon cancer screening: It is important to start screening for colon cancer at age 45.  Have a colonoscopy test every 10 years (or more often if you're at risk) Or, ask your provider about stool tests like a FIT test every year or Cologuard test every 3 years.  To learn more about your testing options, visit: https://www.[employer]/401297.pdf.  For help making a decision, visit: https://bit.ly/of62068.  Prostate cancer screening test: If you have a prostate and are age 55 to 69, ask your provider if you would  benefit from a yearly prostate cancer screening test.  Lung cancer screening: If you are a current or former smoker age 50 to 80, ask your care team if ongoing lung cancer screenings are right for you.  For informational purposes only. Not to replace the advice of your health care provider. Copyright   2023 [employer]. All rights reserved. Clinically reviewed by the [facility] Transitions Program. Project [employer] 018026 - REV 01/24.    Learning About Stress  What is stress?     Stress is your body's response to a hard situation. Your body can have a physical, emotional, or mental response. Stress is a fact of life for most people, and it affects everyone differently. What causes stress for you may not be stressful for someone else.  A lot of things can cause stress. You may feel stress when you go on a job interview, take a test, or run a race. This kind of short-term stress is normal and even useful. It can help you if you need to work hard or react quickly. For example, stress can help you finish an important job on time.  Long-term stress is caused by ongoing stressful situations or events. Examples of long-term stress include long-term health problems, ongoing problems at work, or conflicts in your family. Long-term stress can harm your health.  How does stress affect your health?  When you are stressed, your body responds as though you are in danger. It makes hormones that speed up your heart, make you breathe faster, and give you a burst of energy. This is called the fight-or-flight stress response. If the stress is over quickly, your body goes back to normal and no harm is done.  But if stress happens too often or lasts too long, it can have bad effects. Long-term stress can make you more likely to get sick, and it can make symptoms of some diseases worse. If you tense up when you are stressed, you may develop neck, shoulder, or low back pain. Stress is linked to high blood pressure and heart  disease.  Stress also harms your emotional health. It can make you dunlap, tense, or depressed. Your relationships may suffer, and you may not do well at work or school.  What can you do to manage stress?  You can try these things to help manage stress:   Do something active. Exercise or activity can help reduce stress. Walking is a great way to get started. Even everyday activities such as housecleaning or yard work can help.  Try yoga or venkatesh chi. These techniques combine exercise and meditation. You may need some training at first to learn them.  Do something you enjoy. For example, listen to music or go to a movie. Practice your hobby or do volunteer work.  Meditate. This can help you relax, because you are not worrying about what happened before or what may happen in the future.  Do guided imagery. Imagine yourself in any setting that helps you feel calm. You can use online videos, books, or a teacher to guide you.  Do breathing exercises. For example:  From a standing position, bend forward from the waist with your knees slightly bent. Let your arms dangle close to the floor.  Breathe in slowly and deeply as you return to a standing position. Roll up slowly and lift your head last.  Hold your breath for just a few seconds in the standing position.  Breathe out slowly and bend forward from the waist.  Let your feelings out. Talk, laugh, cry, and express anger when you need to. Talking with supportive friends or family, a counselor, or a gian leader about your feelings is a healthy way to relieve stress. Avoid discussing your feelings with people who make you feel worse.  Write. It may help to write about things that are bothering you. This helps you find out how much stress you feel and what is causing it. When you know this, you can find better ways to cope.  What can you do to prevent stress?  You might try some of these things to help prevent stress:  Manage your time. This helps you find time to do the  "things you want and need to do.  Get enough sleep. Your body recovers from the stresses of the day while you are sleeping.  Get support. Your family, friends, and community can make a difference in how you experience stress.  Limit your news feed. Avoid or limit time on social media or news that may make you feel stressed.  Do something active. Exercise or activity can help reduce stress. Walking is a great way to get started.  Where can you learn more?  Go to https://www.[employer].net/patiented  Enter N032 in the search box to learn more about \"Learning About Stress.\"  Current as of: October 24, 2023               Content Version: 14.0    9721-5163 [employer].   Care instructions adapted under license by your healthcare professional. If you have questions about a medical condition or this instruction, always ask your healthcare professional. [employer] disclaims any warranty or liability for your use of this information.         Arnoldo Jaimes is a 33 year old, presenting for the following:  Physical and Pharyngitis        4/19/2024     9:31 AM   Additional Questions   Roomed by Michelle   Accompanied by alone         4/19/2024     9:31 AM   Patient Reported Additional Medications   Patient reports taking the following new medications none        Health Care Directive  Patient does not have a Health Care Directive or Living Will: Discussed advance care planning with patient; information given to patient to review.    Pharyngitis     Has bump in left posterior oropharynx and had it in November when also had throat pain.  Not sure if it's there between viral illness.     Takes Flexeril as needed for back pain (had flare after COVID and after traveling).  Saw pain clinic this past Monday 4/15 (Dr. Kate).      Psychiatry- Dr. Marley- prescribes bupropion for depression, anxiety, ADHD.    OB[facility]N- Dr. Sl[facility] at [facility].  Next ultrasound is on Thursday.  Prescribes letrozole, " metformin.  Has PCOS.          4/12/2024   General Health   How would you rate your overall physical health? Good   Feel stress (tense, anxious, or unable to sleep) Only a little   (!) STRESS CONCERN      4/12/2024   Nutrition   Three or more servings of calcium each day? Yes   Diet: Regular (no restrictions)   How many servings of fruit and vegetables per day? (!) 2-3   How many sweetened beverages each day? 0-1         4/12/2024   Exercise   Days per week of moderate/strenous exercise 5 days   Average minutes spent exercising at this level 30 min         4/12/2024   Social Factors   Frequency of gathering with friends or relatives Once a week   Worry food won't last until get money to buy more No   Food not last or not have enough money for food? No   Do you have housing?  Yes   Are you worried about losing your housing? No   Lack of transportation? No   Unable to get utilities (heat,electricity)? No         4/12/2024   Dental   Dentist two times every year? Yes         4/12/2024   TB Screening   Were you born outside of the [place]? No                 4/12/2024   Substance Use   Alcohol more than 3/day or more than 7/wk No   Do you use any other substances recreationally? No     Social History     Tobacco Use    Smoking status: Never    Smokeless tobacco: Never   Vaping Use    Vaping status: Never Used   Substance Use Topics    Alcohol use: Not Currently     Comment: Rarely    Drug use: No             4/12/2024   Breast Cancer Screening   Family history of breast, colon, or ovarian cancer? No / Unknown                4/12/2024   One time HIV Screening   Previous HIV test? Yes         4/12/2024   STI Screening   New sexual partner(s) since last STI/HIV test? No     History of abnormal Pap smear: NO - age 30-65 PAP every 5 years with negative HPV co-testing recommended        Latest Ref Rng & Units 5/11/2022     1:22 PM   PAP / HPV   PAP  Negative for Intraepithelial Lesion or Malignancy (NILM)    HPV 16 DNA Negative  "Negative    HPV 18 DNA Negative Negative    Other HR HPV Negative Negative            4/12/2024   Contraception/Family Planning   Questions about contraception or family planning No        Reviewed and updated as needed this visit by Provider        Epi Espinoza             Past Medical History:   Diagnosis Date    Anxiety     Depressive disorder Life long    Consistently goes to psychiatrist for the past 10 years    Hashimoto's thyroiditis      Past Surgical History:   Procedure Laterality Date    adnoidectomy Bilateral     [facility] REMOVAL OF ANAL FISSURE      ~Dec 2020; [facility]    tonsillectomy Bilateral      Lab work is in process         Objective    Exam  /70 (BP Location: Right arm, Patient Position: Sitting, Cuff Size: Adult Regular)   Pulse 87   Temp 98.2  F (36.8  C) (Temporal)   Resp 20   Ht 1.598 m (5' 2.9\")   Wt 72.4 kg (159 lb 11.2 oz)   LMP 04/15/2024 (Exact Date)   SpO2 98%   BMI 28.38 kg/m     Estimated body mass index is 28.38 kg/m  as calculated from the following:    Height as of this encounter: 1.598 m (5' 2.9\").    Weight as of this encounter: 72.4 kg (159 lb 11.2 oz).    Physical Exam  GENERAL: alert and no distress  EYES: Eyes grossly normal to inspection, PERRL and conjunctivae and sclerae normal  HENT: ear canals and TM's normal, nose and mouth without ulcers or lesions  NECK: no adenopathy, posterior oropharynx with pink colored papule ~2 mm towards left only visible with elevation of soft palate  RESP: lungs clear to auscultation - no rales, rhonchi or wheezes  BREAST: normal without masses, tenderness or nipple discharge and no palpable axillary masses or adenopathy; keloid at site of cyst removal between breasts  CV: regular rate and rhythm, normal S1 S2, no S3 or S4, no murmur, click or rub, no peripheral edema  ABDOMEN: soft, nontender, no hepatosplenomegaly, no masses and bowel sounds normal  MS: no gross musculoskeletal defects noted, no edema  SKIN: no suspicious " lesions or rashes  NEURO: Normal strength and tone, mentation intact and speech normal  PSYCH: mentation appears normal, affect normal/bright        Signed Electronically by: Cayla Reese, DO

## 2024-04-19 NOTE — PATIENT INSTRUCTIONS
Preventive Care Advice   This is general advice given by our system to help you stay healthy. However, your care team may have specific advice just for you. Please talk to your care team about your preventive care needs.  Nutrition  Eat 5 or more servings of fruits and vegetables each day.  Try wheat bread, brown rice and whole grain pasta (instead of white bread, rice, and pasta).  Get enough calcium and vitamin D. Check the label on foods and aim for 100% of the RDA (recommended daily allowance).  Lifestyle  Exercise at least 150 minutes each week   (30 minutes a day, 5 days a week).  Do muscle strengthening activities 2 days a week. These help control your weight and prevent disease.  No smoking.  Wear sunscreen to prevent skin cancer.  Have a dental exam and cleaning every 6 months.  Yearly exams  See your health care team every year to talk about:  Any changes in your health.  Any medicines your care team has prescribed.  Preventive care, family planning, and ways to prevent chronic diseases.  Shots (vaccines)   HPV shots (up to age 26), if you've never had them before.  Hepatitis B shots (up to age 59), if you've never had them before.  COVID-19 shot: Get this shot when it's due.  Flu shot: Get a flu shot every year.  Tetanus shot: Get a tetanus shot every 10 years.  Pneumococcal, hepatitis A, and RSV shots: Ask your care team if you need these based on your risk.  Shingles shot (for age 50 and up).  General health tests  Diabetes screening:  Starting at age 35, Get screened for diabetes at least every 3 years.  If you are younger than age 35, ask your care team if you should be screened for diabetes.  Cholesterol test: At age 39, start having a cholesterol test every 5 years, or more often if advised.  Bone density scan (DEXA): At age 50, ask your care team if you should have this scan for osteoporosis (brittle bones).  Hepatitis C: Get tested at least once in your life.  STIs (sexually transmitted  infections)  Before age 24: Ask your care team if you should be screened for STIs.  After age 24: Get screened for STIs if you're at risk. You are at risk for STIs (including HIV) if:  You are sexually active with more than one person.  You don't use condoms every time.  You or a partner was diagnosed with a sexually transmitted infection.  If you are at risk for HIV, ask about PrEP medicine to prevent HIV.  Get tested for HIV at least once in your life, whether you are at risk for HIV or not.  Cancer screening tests  Cervical cancer screening: If you have a cervix, begin getting regular cervical cancer screening tests at age 21. Most people who have regular screenings with normal results can stop after age 65. Talk about this with your provider.  Breast cancer scan (mammogram): If you've ever had breasts, begin having regular mammograms starting at age 40. This is a scan to check for breast cancer.  Colon cancer screening: It is important to start screening for colon cancer at age 45.  Have a colonoscopy test every 10 years (or more often if you're at risk) Or, ask your provider about stool tests like a FIT test every year or Cologuard test every 3 years.  To learn more about your testing options, visit: [email or web address]/[email or web address].  For help making a decision, visit: https://bit.ly/ls27556.  Prostate cancer screening test: If you have a prostate and are age 55 to 69, ask your provider if you would benefit from a yearly prostate cancer screening test.  Lung cancer screening: If you are a current or former smoker age 50 to 80, ask your care team if ongoing lung cancer screenings are right for you.  For informational purposes only. Not to replace the advice of your health care provider. Copyright   2023 [employer]. All rights reserved. Clinically reviewed by the [facility]. Tappx 827117 - REV 01/24.    Learning About Stress  What is stress?     Stress is your  body's response to a hard situation. Your body can have a physical, emotional, or mental response. Stress is a fact of life for most people, and it affects everyone differently. What causes stress for you may not be stressful for someone else.  A lot of things can cause stress. You may feel stress when you go on a job interview, take a test, or run a race. This kind of short-term stress is normal and even useful. It can help you if you need to work hard or react quickly. For example, stress can help you finish an important job on time.  Long-term stress is caused by ongoing stressful situations or events. Examples of long-term stress include long-term health problems, ongoing problems at work, or conflicts in your family. Long-term stress can harm your health.  How does stress affect your health?  When you are stressed, your body responds as though you are in danger. It makes hormones that speed up your heart, make you breathe faster, and give you a burst of energy. This is called the fight-or-flight stress response. If the stress is over quickly, your body goes back to normal and no harm is done.  But if stress happens too often or lasts too long, it can have bad effects. Long-term stress can make you more likely to get sick, and it can make symptoms of some diseases worse. If you tense up when you are stressed, you may develop neck, shoulder, or low back pain. Stress is linked to high blood pressure and heart disease.  Stress also harms your emotional health. It can make you dunlap, tense, or depressed. Your relationships may suffer, and you may not do well at work or school.  What can you do to manage stress?  You can try these things to help manage stress:   Do something active. Exercise or activity can help reduce stress. Walking is a great way to get started. Even everyday activities such as housecleaning or yard work can help.  Try yoga or venkatesh chi. These techniques combine exercise and meditation. You may need  some training at first to learn them.  Do something you enjoy. For example, listen to music or go to a movie. Practice your hobby or do volunteer work.  Meditate. This can help you relax, because you are not worrying about what happened before or what may happen in the future.  Do guided imagery. Imagine yourself in any setting that helps you feel calm. You can use online videos, books, or a teacher to guide you.  Do breathing exercises. For example:  From a standing position, bend forward from the waist with your knees slightly bent. Let your arms dangle close to the floor.  Breathe in slowly and deeply as you return to a standing position. Roll up slowly and lift your head last.  Hold your breath for just a few seconds in the standing position.  Breathe out slowly and bend forward from the waist.  Let your feelings out. Talk, laugh, cry, and express anger when you need to. Talking with supportive friends or family, a counselor, or a gian leader about your feelings is a healthy way to relieve stress. Avoid discussing your feelings with people who make you feel worse.  Write. It may help to write about things that are bothering you. This helps you find out how much stress you feel and what is causing it. When you know this, you can find better ways to cope.  What can you do to prevent stress?  You might try some of these things to help prevent stress:  Manage your time. This helps you find time to do the things you want and need to do.  Get enough sleep. Your body recovers from the stresses of the day while you are sleeping.  Get support. Your family, friends, and community can make a difference in how you experience stress.  Limit your news feed. Avoid or limit time on social media or news that may make you feel stressed.  Do something active. Exercise or activity can help reduce stress. Walking is a great way to get started.  Where can you learn more?  Go to https://www.healthwise.net/patiented  Enter N032 in the  "search box to learn more about \"Learning About Stress.\"  Current as of: October 24, 2023               Content Version: 14.0    0579-6461 [employer].   Care instructions adapted under license by your healthcare professional. If you have questions about a medical condition or this instruction, always ask your healthcare professional. [employer] disclaims any warranty or liability for your use of this information.      "

## 2024-06-03 ENCOUNTER — APPOINTMENT (OUTPATIENT)
Dept: URBAN - METROPOLITAN AREA CLINIC 253 | Age: 34
Setting detail: DERMATOLOGY
End: 2024-06-04

## 2024-06-03 VITALS — WEIGHT: 158 LBS | HEIGHT: 63 IN

## 2024-06-03 DIAGNOSIS — L30.9 DERMATITIS, UNSPECIFIED: ICD-10-CM

## 2024-06-03 DIAGNOSIS — L21.8 OTHER SEBORRHEIC DERMATITIS: ICD-10-CM

## 2024-06-03 PROCEDURE — OTHER PRESCRIPTION MEDICATION MANAGEMENT: OTHER

## 2024-06-03 PROCEDURE — OTHER PRESCRIPTION: OTHER

## 2024-06-03 PROCEDURE — OTHER COUNSELING: OTHER

## 2024-06-03 PROCEDURE — 99214 OFFICE O/P EST MOD 30 MIN: CPT

## 2024-06-03 PROCEDURE — OTHER OTC TREATMENT REGIMEN: OTHER

## 2024-06-03 PROCEDURE — OTHER MIPS QUALITY: OTHER

## 2024-06-03 PROCEDURE — OTHER PATIENT SPECIFIC COUNSELING: OTHER

## 2024-06-03 RX ORDER — KETOCONAZOLE 20 MG/ML
SHAMPOO, SUSPENSION TOPICAL AS DIRECTED
Qty: 120 | Refills: 11 | Status: ERX

## 2024-06-03 ASSESSMENT — ITCH NUMERIC RATING SCALE: HOW SEVERE IS YOUR ITCHING?: 7

## 2024-06-03 ASSESSMENT — SEVERITY ASSESSMENT: SEVERITY: MODERATE

## 2024-06-03 NOTE — PROCEDURE: PATIENT SPECIFIC COUNSELING
-Discussed dx of Eczema vs Drug Eruption vs Intertrigo\\n-Informed patient flare from 2 weeks ago may be more atopic derm\\n-Advised patient to treat similar flares with her topical steroid BID x 2 weeks, risks of atrophy reviewed and all questions answered\\n-Discussed of trial an OTC antihistamine as a preventative measure\\n-The patient already being on a low dose hydroxyzine daily
Detail Level: Zone
-Assured patient it was acceptable for refills of the keto shampoo to placed\\n-Discussed options of alternative OTC antifungal shampoos along with the keto shampoo for more relief\\n-The patient was agreeable.

## 2024-06-03 NOTE — PROCEDURE: OTC TREATMENT REGIMEN
Patient Specific Otc Recommendations (Will Not Stick From Patient To Patient): Alternative OTC anti fungal shampoos with keto shampoos
Detail Level: Zone

## 2024-06-03 NOTE — PROCEDURE: PRESCRIPTION MEDICATION MANAGEMENT
Detail Level: Zone
Continue Regimen: With flares, apply triamcinolone acetonide 0.1 % topical cream BID x 2 weeks
Render In Strict Bullet Format?: No
Continue Regimen: Apply ketoconazole 2 % shampoo to affected areas, lather, and let sit 3-5 minutes before rinsing. Use a few times per week until improved, then as needed for seborrheic dermatitis.

## 2024-06-21 ASSESSMENT — ENCOUNTER SYMPTOMS
EYES NEGATIVE: 1
CONSTITUTIONAL NEGATIVE: 1
RESPIRATORY NEGATIVE: 1

## 2024-06-21 NOTE — PROGRESS NOTES
Chief Complaint   Patient presents with    Throat Problem     Patient has had a lesion present in her throat for 1-2 years. It is only painful when she is sick due to her swelling.       PCP: Cayla Reese     Referring Provider: Cayla Reese    There were no vitals taken for this visit.    ENT Problem List:  Patient Active Problem List   Diagnosis    Moderate episode of recurrent major depressive disorder (H)    ASIA (generalized anxiety disorder)    Cyst of right ovary    Hashimoto's antibody positive; recent TSH normal    Miscarriage    PCOS (polycystic ovarian syndrome)      Current Medications:  Current Outpatient Medications   Medication Sig Dispense Refill    cyclobenzaprine (FLEXERIL) 10 MG tablet Take 1 tablet (10 mg) by mouth 3 times daily as needed for muscle spasms 30 tablet 0    hydrOXYzine HCl (ATARAX) 10 MG tablet Take 10 mg by mouth 2 times daily as needed for anxiety      metFORMIN (GLUCOPHAGE) 500 MG tablet Take 500 mg by mouth 2 times daily (with meals)      buPROPion (WELLBUTRIN XL) 150 MG 24 hr tablet Take 150 mg by mouth every morning      letrozole (FEMARA) 2.5 MG tablet TAKE 1 TABLET BY MOUTH EVERY DAY ON CYCLE DAYS 3-9      OVIDREL 250 MCG/0.5ML syringe SC INJ INJECT 0.5 ML UNDER THE SKIN ONCE       No current facility-administered medications for this visit.     HPI  Pleasant 34 year old female presents today as a(n) new patient for a polyp in her throat that onset a few years ago. She states that she gets sick once a year and notices pain with the polyp then, but otherwise it has not grown or bothered her much.  She reports a hx of a tonsillectomy.  She states that she is pregnant and has had nausea and vomiting.  She states that she has PCOS and takes Metformin for it.  She takes Hydroxyzine HCL as needed for stress and when the pollen count is high. She states that she rarely takes it.  She reports some right ear otalgia that she thinks is cerumen.  She states that  she was vaccinated against HPV in high school.    She denies dysphagia, pain when swallowing, voice concerns, post nasal drip, nasal congestion, allergies, rhinorrhea, sneezing, coughing, other head and neck surgeries, use of nasal sprays, tinnitus, dizziness/vertigo, balance issues.    Review of Systems   Constitutional: Negative.    HENT:  Positive for ear pain and sore throat. Negative for congestion and tinnitus.    Eyes: Negative.    Respiratory: Negative.  Negative for cough.    Gastrointestinal:  Positive for nausea and vomiting.   Musculoskeletal:  Negative for falls.   Skin: Negative.    Neurological:  Negative for dizziness.   Endo/Heme/Allergies: Negative.  Negative for environmental allergies.       Physical Exam  Vitals and nursing note reviewed.   Constitutional:       Appearance: Normal appearance.   HENT:      Head: Normocephalic and atraumatic.      Jaw: There is normal jaw occlusion.      Right Ear: Hearing, tympanic membrane and ear canal normal.      Left Ear: Hearing, tympanic membrane and ear canal normal.      Nose: Septal deviation (slight) present. No mucosal edema, congestion or rhinorrhea.      Right Nostril: No occlusion.      Left Nostril: No occlusion.      Right Turbinates: Not enlarged or swollen.      Left Turbinates: Not swollen.      Right Sinus: No maxillary sinus tenderness or frontal sinus tenderness.      Left Sinus: No maxillary sinus tenderness or frontal sinus tenderness.      Mouth/Throat:      Mouth: Mucous membranes are moist. Oral lesions present.      Pharynx: Oropharynx is clear. Uvula midline.      Comments: There was a small 3 mm lesion/ vegetation at the posterior left side of the pharyngeal wall.  Eyes:      Extraocular Movements: Extraocular movements intact.      Pupils: Pupils are equal, round, and reactive to light.   Neurological:      Mental Status: She is alert.       A/P  This pleasant patient has a small papillomas lesion at the posterior left side of the  pharyngeal wall. Potentially causes including HPV, irritation fibroma or a submucosal lymphoid tissue are discussed. There are no neoplastic changes, swelling around the area, infections, tumors, other lesions, or other swollen glands present. She is advised to monitor the area for changes in size or irritations. If it becomes bigger, the option of an excisional biopsy is discussed.    Follow up in clinic prn.    Scribe/Staff:    Scribe Disclosure:   I, Shayy Samuel, am serving as a scribe; to document services personally performed by Kahlil Romo MD based on data collection and the provider's statements to me.     Provider Disclosure:  I agree with above History, Review of Systems, Physical exam and Plan.  I have reviewed the content of the documentation and have edited it as needed. I have personally performed the services documented here and the documentation accurately represents those services and the decisions I have made.      Electronically signed by:  Kahlil Romo MD

## 2024-06-28 ENCOUNTER — OFFICE VISIT (OUTPATIENT)
Dept: OTOLARYNGOLOGY | Facility: CLINIC | Age: 34
End: 2024-06-28
Attending: OTOLARYNGOLOGY
Payer: COMMERCIAL

## 2024-06-28 DIAGNOSIS — R07.0 THROAT PAIN: ICD-10-CM

## 2024-06-28 DIAGNOSIS — K13.70 MOUTH LESION: ICD-10-CM

## 2024-06-28 PROCEDURE — 99203 OFFICE O/P NEW LOW 30 MIN: CPT | Performed by: OTOLARYNGOLOGY

## 2024-06-28 ASSESSMENT — ENCOUNTER SYMPTOMS
COUGH: 0
VOMITING: 1
DIZZINESS: 0
FALLS: 0
SORE THROAT: 1
NAUSEA: 1

## 2024-06-28 ASSESSMENT — PAIN SCALES - GENERAL: PAINLEVEL: NO PAIN (0)

## 2024-06-28 NOTE — NURSING NOTE
Fiona Henry's chief complaint for this visit includes:  Chief Complaint   Patient presents with    Throat Problem     Patient has had a lesion present in her throat for 1-2 years. It is only painful when she is sick due to her swelling.      PCP: Cayla Reese    Referring Provider:  Cayla Reese DO  [address]  [place] [address]      Katie Escobedo  June 28, 2024

## 2024-06-28 NOTE — LETTER
6/28/2024      Fiona Henry  [address]  [facility] [address]      Dear Colleague,    Thank you for referring your patient, Fiona Henry, to the [facility]. Please see a copy of my visit note below.    Chief Complaint   Patient presents with     Throat Problem     Patient has had a lesion present in her throat for 1-2 years. It is only painful when she is sick due to her swelling.       PCP: Cayla Reese     Referring Provider: Cayla Reese    There were no vitals taken for this visit.    ENT Problem List:  Patient Active Problem List   Diagnosis     Moderate episode of recurrent major depressive disorder (H)     ASIA (generalized anxiety disorder)     Cyst of right ovary     Hashimoto's antibody positive; recent TSH normal     Miscarriage     PCOS (polycystic ovarian syndrome)      Current Medications:  Current Outpatient Medications   Medication Sig Dispense Refill     cyclobenzaprine (FLEXERIL) 10 MG tablet Take 1 tablet (10 mg) by mouth 3 times daily as needed for muscle spasms 30 tablet 0     hydrOXYzine HCl (ATARAX) 10 MG tablet Take 10 mg by mouth 2 times daily as needed for anxiety       metFORMIN (GLUCOPHAGE) 500 MG tablet Take 500 mg by mouth 2 times daily (with meals)       buPROPion (WELLBUTRIN XL) 150 MG 24 hr tablet Take 150 mg by mouth every morning       letrozole (FEMARA) 2.5 MG tablet TAKE 1 TABLET BY MOUTH EVERY DAY ON CYCLE DAYS 3-9       OVIDREL 250 MCG/0.5ML syringe SC INJ INJECT 0.5 ML UNDER THE SKIN ONCE       No current facility-administered medications for this visit.     HPI  Pleasant 34 year old female presents today as a(n) new patient for a polyp in her throat that onset a few years ago. She states that she gets sick once a year and notices pain with the polyp then, but otherwise it has not grown or bothered her much.  She reports a hx of a tonsillectomy.  She states that she is pregnant and has had nausea and vomiting.  She  states that she has PCOS and takes Metformin for it.  She takes Hydroxyzine HCL as needed for stress and when the pollen count is high. She states that she rarely takes it.  She reports some right ear otalgia that she thinks is cerumen.  She states that she was vaccinated against HPV in high school.    She denies dysphagia, pain when swallowing, voice concerns, post nasal drip, nasal congestion, allergies, rhinorrhea, sneezing, coughing, other head and neck surgeries, use of nasal sprays, tinnitus, dizziness/vertigo, balance issues.    Review of Systems   Constitutional: Negative.    HENT:  Positive for ear pain and sore throat. Negative for congestion and tinnitus.    Eyes: Negative.    Respiratory: Negative.  Negative for cough.    Gastrointestinal:  Positive for nausea and vomiting.   Musculoskeletal:  Negative for falls.   Skin: Negative.    Neurological:  Negative for dizziness.   Endo/Heme/Allergies: Negative.  Negative for environmental allergies.       Physical Exam  Vitals and nursing note reviewed.   Constitutional:       Appearance: Normal appearance.   HENT:      Head: Normocephalic and atraumatic.      Jaw: There is normal jaw occlusion.      Right Ear: Hearing, tympanic membrane and ear canal normal.      Left Ear: Hearing, tympanic membrane and ear canal normal.      Nose: Septal deviation (slight) present. No mucosal edema, congestion or rhinorrhea.      Right Nostril: No occlusion.      Left Nostril: No occlusion.      Right Turbinates: Not enlarged or swollen.      Left Turbinates: Not swollen.      Right Sinus: No maxillary sinus tenderness or frontal sinus tenderness.      Left Sinus: No maxillary sinus tenderness or frontal sinus tenderness.      Mouth/Throat:      Mouth: Mucous membranes are moist. Oral lesions present.      Pharynx: Oropharynx is clear. Uvula midline.      Comments: There was a small 3 mm lesion/ vegetation at the posterior left side of the pharyngeal wall.  Eyes:       Extraocular Movements: Extraocular movements intact.      Pupils: Pupils are equal, round, and reactive to light.   Neurological:      Mental Status: She is alert.       A/P  This pleasant patient has a small papillomas lesion at the posterior left side of the pharyngeal wall. Potentially causes including HPV, irritation fibroma or a submucosal lymphoid tissue are discussed. There are no neoplastic changes, swelling around the area, infections, tumors, other lesions, or other swollen glands present. She is advised to monitor the area for changes in size or irritations. If it becomes bigger, the option of an excisional biopsy is discussed.    Follow up in clinic prn.    Scribe/Staff:    Scribe Disclosure:   I, Shayy Samuel, am serving as a scribe; to document services personally performed by Kahlil Romo MD based on data collection and the provider's statements to me.     Provider Disclosure:  I agree with above History, Review of Systems, Physical exam and Plan.  I have reviewed the content of the documentation and have edited it as needed. I have personally performed the services documented here and the documentation accurately represents those services and the decisions I have made.      Electronically signed by:  Kahlil Romo MD         Again, thank you for allowing me to participate in the care of your patient.        Sincerely,        Kahlil Romo MD

## 2024-07-03 LAB
C TRACH DNA SPEC QL PROBE+SIG AMP: NEGATIVE
HEPATITIS B SURFACE ANTIGEN (EXTERNAL): NEGATIVE
N GONORRHOEA DNA SPEC QL PROBE+SIG AMP: NEGATIVE
RUBELLA ANTIBODY IGG (EXTERNAL): NORMAL
SPECIMEN DESCRIP: NORMAL
SPECIMEN DESCRIPTION: NORMAL
TREPONEMA PALLIDUM ANTIBODY (EXTERNAL): NONREACTIVE

## 2024-08-21 ASSESSMENT — PATIENT HEALTH QUESTIONNAIRE - PHQ9
SUM OF ALL RESPONSES TO PHQ QUESTIONS 1-9: 0
SUM OF ALL RESPONSES TO PHQ QUESTIONS 1-9: 0
10. IF YOU CHECKED OFF ANY PROBLEMS, HOW DIFFICULT HAVE THESE PROBLEMS MADE IT FOR YOU TO DO YOUR WORK, TAKE CARE OF THINGS AT HOME, OR GET ALONG WITH OTHER PEOPLE: NOT DIFFICULT AT ALL

## 2024-08-22 ENCOUNTER — OFFICE VISIT (OUTPATIENT)
Dept: FAMILY MEDICINE | Facility: CLINIC | Age: 34
End: 2024-08-22
Payer: COMMERCIAL

## 2024-08-22 VITALS
SYSTOLIC BLOOD PRESSURE: 105 MMHG | RESPIRATION RATE: 18 BRPM | TEMPERATURE: 97.6 F | DIASTOLIC BLOOD PRESSURE: 73 MMHG | WEIGHT: 150 LBS | BODY MASS INDEX: 26.66 KG/M2 | OXYGEN SATURATION: 97 % | HEART RATE: 106 BPM

## 2024-08-22 DIAGNOSIS — J02.9 SORE THROAT: Primary | ICD-10-CM

## 2024-08-22 DIAGNOSIS — Z11.4 SCREENING FOR HIV (HUMAN IMMUNODEFICIENCY VIRUS): ICD-10-CM

## 2024-08-22 DIAGNOSIS — Z11.59 NEED FOR HEPATITIS C SCREENING TEST: ICD-10-CM

## 2024-08-22 LAB
DEPRECATED S PYO AG THROAT QL EIA: NEGATIVE
FLUAV RNA SPEC QL NAA+PROBE: NEGATIVE
FLUBV RNA RESP QL NAA+PROBE: NEGATIVE
GROUP A STREP BY PCR: NOT DETECTED
RSV RNA SPEC NAA+PROBE: NEGATIVE
SARS-COV-2 RNA RESP QL NAA+PROBE: NEGATIVE

## 2024-08-22 PROCEDURE — 87637 SARSCOV2&INF A&B&RSV AMP PRB: CPT | Performed by: FAMILY MEDICINE

## 2024-08-22 PROCEDURE — 87651 STREP A DNA AMP PROBE: CPT | Performed by: FAMILY MEDICINE

## 2024-08-22 PROCEDURE — 99213 OFFICE O/P EST LOW 20 MIN: CPT | Performed by: FAMILY MEDICINE

## 2024-08-22 ASSESSMENT — ENCOUNTER SYMPTOMS
NAUSEA: 1
SORE THROAT: 1
HEADACHES: 1
DIZZINESS: 1

## 2024-08-22 NOTE — PROGRESS NOTES
"  Assessment & Plan     Sore throat  Differential diagnosis discussed included viral infection, discussed symptomatic care with gargle with salt and water, acetaminophen as needed, rapid strep culture negative  - Streptococcus A Rapid Screen w/Reflex to PCR - Clinic Collect  - Group A Streptococcus PCR Throat Swab  - Symptomatic Influenza A/B, RSV, & SARS-CoV2 PCR (COVID-19) Nose; Future  - Symptomatic Influenza A/B, RSV, & SARS-CoV2 PCR (COVID-19) Nose    Screening for HIV (human immunodeficiency virus)  Need for hepatitis C screening test  To be done at the GYN office.          BMI  Estimated body mass index is 26.66 kg/m  as calculated from the following:    Height as of 4/19/24: 1.598 m (5' 2.9\").    Weight as of this encounter: 68 kg (150 lb).         Arnoldo Jaimes is a 34 year old, presenting for the following health issues:  Cold Symptoms (Tested negative for covid, headache on Friday, sore throat, congestion, sob for one weeks ), Pharyngitis, Dizziness, Headache, and Nausea      8/22/2024     2:17 PM   Additional Questions   Roomed by hser   Accompanied by self     Pharyngitis   Associated symptoms include headaches.   Dizziness  Associated symptoms include headaches, nausea and a sore throat.   Headache   Associated symptoms include nausea.   Nausea  Associated symptoms include headaches, nausea and a sore throat.   History of Present Illness       Reason for visit:  I think I have a virus or cold  Symptom onset:  3-7 days ago  Symptoms include:  Headache, congestion, shortness of breath, nausea, dizziness  Symptom intensity:  Moderate  Symptom progression:  Worsening  Had these symptoms before:  No  What makes it worse:  Activity  What makes it better:  Rest   She is taking medications regularly.       Cold symptoms started about 6 days ago with sore throat, nasal congestion, postnasal drip, slight cough, mild nausea, low-grade fever, she took a home COVID-19 test x 2 and it came back negative, she " is 14-week pregnant, her GYN would like her to see a GP.  No abdominal cramping.  Benadryl seems to help, also took Tylenol.        Review of Systems  Constitutional, HEENT, cardiovascular, pulmonary, gi and gu systems are negative, except as otherwise noted.      Objective    /73 (BP Location: Left arm, Patient Position: Sitting, Cuff Size: Adult Regular)   Pulse 106   Temp 97.6  F (36.4  C) (Temporal)   Resp 18   Wt 68 kg (150 lb)   LMP 04/15/2024 (Exact Date)   SpO2 97%   BMI 26.66 kg/m    Body mass index is 26.66 kg/m .  Physical Exam   GENERAL: alert and no distress  HENT: ear canals and TM's normal, nose and mouth without ulcers or lesions  NECK: no adenopathy, no asymmetry, masses, or scars  RESP: lungs clear to auscultation - no rales, rhonchi or wheezes  CV: regular rate and rhythm, normal S1 S2, no S3 or S4, no murmur, click or rub, no peripheral edema  ABDOMEN: soft, nontender, no hepatosplenomegaly, no masses and bowel sounds normal  MS: no gross musculoskeletal defects noted, no edema    Results for orders placed or performed in visit on 08/22/24   Symptomatic Influenza A/B, RSV, & SARS-CoV2 PCR (COVID-19) Nose     Status: Normal    Specimen: Nose; Swab   Result Value Ref Range    Influenza A PCR Negative Negative    Influenza B PCR Negative Negative    RSV PCR Negative Negative    SARS CoV2 PCR Negative Negative    Narrative    Testing was performed using the Xpert Xpress CoV2/Flu/RSV Assay on the [employer] GeneXpert Instrument. This test should be ordered for the detection of SARS-CoV2, influenza, and RSV viruses in individuals with signs and symptoms of respiratory tract infection. This test is for in vitro diagnostic use under the [place] FDA for laboratories certified under CLIA to perform high or moderate complexity testing. This test has been [place] FDA cleared. A negative result does not rule out the presence of PCR inhibitors in the specimen or target RNA in concentration below the limit of  detection for the assay. If only one viral target is positive but coinfection with multiple targets is suspected, the sample should be re-tested with another FDA cleared, approved, or authorized test, if coninfection would change clinical management. This test was validated by the [facility]. These laboratories are certified under the Clinical Laboratory Improvement Amendments of 1988 (CLIA-88) as qualified to perfom high complexity laboratory testing.   Streptococcus A Rapid Screen w/Reflex to PCR - Clinic Collect     Status: Normal    Specimen: Throat; Swab   Result Value Ref Range    Group A Strep antigen Negative Negative   Group A Streptococcus PCR Throat Swab     Status: Normal    Specimen: Throat; Swab   Result Value Ref Range    Group A strep by PCR Not Detected Not Detected    Narrative    The Xpert Xpress Strep A test, performed on the [employer], is a rapid, qualitative in vitro diagnostic test for the detection of Streptococcus pyogenes (Group A ß-hemolytic Streptococcus, Strep A) in throat swab specimens from patients with signs and symptoms of pharyngitis. The Xpert Xpress Strep A test can be used as an aid in the diagnosis of Group A Streptococcal pharyngitis. The assay is not intended to monitor treatment for Group A Streptococcus infections. The Xpert Xpress Strep A test utilizes an automated real-time polymerase chain reaction (PCR) to detect Streptococcus pyogenes DNA.           Signed Electronically by: Delonte Martins MD        Prior to immunization administration, verified patients identity using patient s name and date of birth. Please see Immunization Activity for additional information.     Screening Questionnaire for Adult Immunization    Are you sick today?   Yes   Do you have allergies to medications, food, a vaccine component or latex?   No   Have you ever had a serious reaction after receiving a vaccination?   No   Do you have a long-term health  problem with heart, lung, kidney, or metabolic disease (e.g., diabetes), asthma, a blood disorder, no spleen, complement component deficiency, a cochlear implant, or a spinal fluid leak?  Are you on long-term aspirin therapy?   No   Do you have cancer, leukemia, HIV/AIDS, or any other immune system problem?   No   Do you have a parent, brother, or sister with an immune system problem?   No   In the past 3 months, have you taken medications that affect  your immune system, such as prednisone, other steroids, or anticancer drugs; drugs for the treatment of rheumatoid arthritis, Crohn s disease, or psoriasis; or have you had radiation treatments?   No   Have you had a seizure, or a brain or other nervous system problem?   No   During the past year, have you received a transfusion of blood or blood    products, or been given immune (gamma) globulin or antiviral drug?   No   For women: Are you pregnant or is there a chance you could become       pregnant during the next month?   Yes   Have you received any vaccinations in the past 4 weeks?   No     Immunization questionnaire was positive for at least one answer.  Notified provider.    This note was completed in part using a voice recognition software, any grammatical or context distortion are unintentional and inherent to the software.    Patient instructed to remain in clinic for 15 minutes afterwards, and to report any adverse reactions.     Screening performed by Citlali Haro MA on 8/22/2024 at 2:23 PM.

## 2024-09-17 ENCOUNTER — APPOINTMENT (OUTPATIENT)
Dept: URBAN - METROPOLITAN AREA CLINIC 253 | Age: 34
Setting detail: DERMATOLOGY
End: 2024-09-17

## 2024-09-17 VITALS — WEIGHT: 151 LBS | RESPIRATION RATE: 14 BRPM | HEIGHT: 63 IN

## 2024-09-17 DIAGNOSIS — L81.4 OTHER MELANIN HYPERPIGMENTATION: ICD-10-CM

## 2024-09-17 DIAGNOSIS — D22 MELANOCYTIC NEVI: ICD-10-CM

## 2024-09-17 DIAGNOSIS — L91.0 HYPERTROPHIC SCAR: ICD-10-CM

## 2024-09-17 DIAGNOSIS — D18.0 HEMANGIOMA: ICD-10-CM

## 2024-09-17 DIAGNOSIS — Z71.89 OTHER SPECIFIED COUNSELING: ICD-10-CM

## 2024-09-17 DIAGNOSIS — L82.1 OTHER SEBORRHEIC KERATOSIS: ICD-10-CM

## 2024-09-17 PROBLEM — D18.01 HEMANGIOMA OF SKIN AND SUBCUTANEOUS TISSUE: Status: ACTIVE | Noted: 2024-09-17

## 2024-09-17 PROBLEM — D22.5 MELANOCYTIC NEVI OF TRUNK: Status: ACTIVE | Noted: 2024-09-17

## 2024-09-17 PROCEDURE — 99213 OFFICE O/P EST LOW 20 MIN: CPT

## 2024-09-17 PROCEDURE — OTHER MIPS QUALITY: OTHER

## 2024-09-17 PROCEDURE — OTHER COUNSELING: OTHER

## 2024-09-17 PROCEDURE — OTHER ADDITIONAL NOTES: OTHER

## 2024-09-17 ASSESSMENT — LOCATION SIMPLE DESCRIPTION DERM
LOCATION SIMPLE: LOWER BACK
LOCATION SIMPLE: UPPER BACK
LOCATION SIMPLE: CHEST

## 2024-09-17 ASSESSMENT — LOCATION DETAILED DESCRIPTION DERM
LOCATION DETAILED: INFERIOR THORACIC SPINE
LOCATION DETAILED: LEFT MEDIAL SUPERIOR CHEST
LOCATION DETAILED: SUPERIOR LUMBAR SPINE

## 2024-09-17 ASSESSMENT — LOCATION ZONE DERM: LOCATION ZONE: TRUNK

## 2024-09-17 NOTE — PROCEDURE: ADDITIONAL NOTES
Render Risk Assessment In Note?: no
Detail Level: Simple
Additional Notes: Discussed option for ILK. This is safe during pregnancy though it is up to the patient if she would like to start or wait until she is postpartum. Her scar is sensitive and intermittently painful.

## 2025-01-23 LAB — GROUP B STREPTOCOCCUS (EXTERNAL): NEGATIVE

## 2025-02-24 ENCOUNTER — TRANSFERRED RECORDS (OUTPATIENT)
Dept: HEALTH INFORMATION MANAGEMENT | Facility: CLINIC | Age: 35
End: 2025-02-24

## 2025-02-24 ENCOUNTER — HOSPITAL ENCOUNTER (INPATIENT)
Facility: CLINIC | Age: 35
LOS: 3 days | Discharge: HOME | End: 2025-02-27
Attending: OBSTETRICS & GYNECOLOGY | Admitting: OBSTETRICS & GYNECOLOGY
Payer: COMMERCIAL

## 2025-02-24 LAB
ABO + RH BLD: NORMAL
BLD GP AB SCN SERPL QL: NEGATIVE
ERYTHROCYTE [DISTWIDTH] IN BLOOD BY AUTOMATED COUNT: 12.9 % (ref 10–15)
HCT VFR BLD AUTO: 35.1 % (ref 35–47)
HGB BLD-MCNC: 12.1 G/DL (ref 11.7–15.7)
MCH RBC QN AUTO: 32.2 PG (ref 26.5–33)
MCHC RBC AUTO-ENTMCNC: 34.5 G/DL (ref 31.5–36.5)
MCV RBC AUTO: 93 FL (ref 78–100)
PLATELET # BLD AUTO: 183 10E3/UL (ref 150–450)
RBC # BLD AUTO: 3.76 10E6/UL (ref 3.8–5.2)
SPECIMEN EXP DATE BLD: NORMAL
WBC # BLD AUTO: 10.8 10E3/UL (ref 4–11)

## 2025-02-24 PROCEDURE — 85027 COMPLETE CBC AUTOMATED: CPT | Performed by: OBSTETRICS & GYNECOLOGY

## 2025-02-24 PROCEDURE — 250N000013 HC RX MED GY IP 250 OP 250 PS 637: Performed by: OBSTETRICS & GYNECOLOGY

## 2025-02-24 PROCEDURE — 120N000001 HC R&B MED SURG/OB

## 2025-02-24 PROCEDURE — 86780 TREPONEMA PALLIDUM: CPT | Performed by: OBSTETRICS & GYNECOLOGY

## 2025-02-24 PROCEDURE — 86900 BLOOD TYPING SEROLOGIC ABO: CPT | Performed by: OBSTETRICS & GYNECOLOGY

## 2025-02-24 RX ORDER — ONDANSETRON 2 MG/ML
4 INJECTION INTRAMUSCULAR; INTRAVENOUS EVERY 6 HOURS PRN
Status: DISCONTINUED | OUTPATIENT
Start: 2025-02-24 | End: 2025-02-25 | Stop reason: HOSPADM

## 2025-02-24 RX ORDER — MISOPROSTOL 100 UG/1
25 TABLET ORAL EVERY 4 HOURS PRN
Status: DISCONTINUED | OUTPATIENT
Start: 2025-02-24 | End: 2025-02-25 | Stop reason: HOSPADM

## 2025-02-24 RX ORDER — LEVOMEFOLATE CALCIUM 15 MG
15 TABLET ORAL DAILY
COMMUNITY

## 2025-02-24 RX ORDER — KETOROLAC TROMETHAMINE 15 MG/ML
15 INJECTION, SOLUTION INTRAMUSCULAR; INTRAVENOUS
Status: DISCONTINUED | OUTPATIENT
Start: 2025-02-24 | End: 2025-02-25 | Stop reason: HOSPADM

## 2025-02-24 RX ORDER — OXYTOCIN/0.9 % SODIUM CHLORIDE 30/500 ML
100-340 PLASTIC BAG, INJECTION (ML) INTRAVENOUS CONTINUOUS PRN
Status: DISCONTINUED | OUTPATIENT
Start: 2025-02-24 | End: 2025-02-27 | Stop reason: HOSPADM

## 2025-02-24 RX ORDER — METOCLOPRAMIDE 10 MG/1
10 TABLET ORAL EVERY 6 HOURS PRN
Status: DISCONTINUED | OUTPATIENT
Start: 2025-02-24 | End: 2025-02-25 | Stop reason: HOSPADM

## 2025-02-24 RX ORDER — MISOPROSTOL 200 UG/1
800 TABLET ORAL
Status: DISCONTINUED | OUTPATIENT
Start: 2025-02-24 | End: 2025-02-25 | Stop reason: HOSPADM

## 2025-02-24 RX ORDER — TRANEXAMIC ACID 10 MG/ML
1 INJECTION, SOLUTION INTRAVENOUS EVERY 30 MIN PRN
Status: DISCONTINUED | OUTPATIENT
Start: 2025-02-24 | End: 2025-02-25 | Stop reason: HOSPADM

## 2025-02-24 RX ORDER — TERBUTALINE SULFATE 1 MG/ML
0.25 INJECTION SUBCUTANEOUS
Status: DISCONTINUED | OUTPATIENT
Start: 2025-02-24 | End: 2025-02-25 | Stop reason: HOSPADM

## 2025-02-24 RX ORDER — OXYTOCIN/0.9 % SODIUM CHLORIDE 30/500 ML
340 PLASTIC BAG, INJECTION (ML) INTRAVENOUS CONTINUOUS PRN
Status: DISCONTINUED | OUTPATIENT
Start: 2025-02-24 | End: 2025-02-25 | Stop reason: HOSPADM

## 2025-02-24 RX ORDER — ONDANSETRON 4 MG/1
4 TABLET, ORALLY DISINTEGRATING ORAL EVERY 6 HOURS PRN
Status: DISCONTINUED | OUTPATIENT
Start: 2025-02-24 | End: 2025-02-25 | Stop reason: HOSPADM

## 2025-02-24 RX ORDER — SODIUM CHLORIDE, SODIUM LACTATE, POTASSIUM CHLORIDE, CALCIUM CHLORIDE 600; 310; 30; 20 MG/100ML; MG/100ML; MG/100ML; MG/100ML
INJECTION, SOLUTION INTRAVENOUS CONTINUOUS
Status: DISCONTINUED | OUTPATIENT
Start: 2025-02-24 | End: 2025-02-25 | Stop reason: HOSPADM

## 2025-02-24 RX ORDER — FENTANYL CITRATE 50 UG/ML
50-100 INJECTION, SOLUTION INTRAMUSCULAR; INTRAVENOUS EVERY 30 MIN PRN
Status: DISCONTINUED | OUTPATIENT
Start: 2025-02-24 | End: 2025-02-25 | Stop reason: HOSPADM

## 2025-02-24 RX ORDER — LOPERAMIDE HYDROCHLORIDE 2 MG/1
4 CAPSULE ORAL
Status: DISCONTINUED | OUTPATIENT
Start: 2025-02-24 | End: 2025-02-25 | Stop reason: HOSPADM

## 2025-02-24 RX ORDER — BUPROPION HYDROCHLORIDE 150 MG/1
300 TABLET ORAL DAILY
Status: DISCONTINUED | OUTPATIENT
Start: 2025-02-25 | End: 2025-02-27 | Stop reason: HOSPADM

## 2025-02-24 RX ORDER — METHYLERGONOVINE MALEATE 0.2 MG/ML
200 INJECTION INTRAVENOUS
Status: DISCONTINUED | OUTPATIENT
Start: 2025-02-24 | End: 2025-02-25 | Stop reason: HOSPADM

## 2025-02-24 RX ORDER — HYDROXYZINE HYDROCHLORIDE 25 MG/1
50 TABLET, FILM COATED ORAL
Status: DISCONTINUED | OUTPATIENT
Start: 2025-02-24 | End: 2025-02-25 | Stop reason: HOSPADM

## 2025-02-24 RX ORDER — CARBOPROST TROMETHAMINE 250 UG/ML
250 INJECTION, SOLUTION INTRAMUSCULAR
Status: DISCONTINUED | OUTPATIENT
Start: 2025-02-24 | End: 2025-02-25 | Stop reason: HOSPADM

## 2025-02-24 RX ORDER — OXYTOCIN 10 [USP'U]/ML
10 INJECTION, SOLUTION INTRAMUSCULAR; INTRAVENOUS
Status: DISCONTINUED | OUTPATIENT
Start: 2025-02-24 | End: 2025-02-25 | Stop reason: HOSPADM

## 2025-02-24 RX ORDER — MISOPROSTOL 200 UG/1
400 TABLET ORAL
Status: DISCONTINUED | OUTPATIENT
Start: 2025-02-24 | End: 2025-02-25 | Stop reason: HOSPADM

## 2025-02-24 RX ORDER — LOPERAMIDE HYDROCHLORIDE 2 MG/1
2 CAPSULE ORAL
Status: DISCONTINUED | OUTPATIENT
Start: 2025-02-24 | End: 2025-02-25 | Stop reason: HOSPADM

## 2025-02-24 RX ORDER — METOCLOPRAMIDE HYDROCHLORIDE 5 MG/ML
10 INJECTION INTRAMUSCULAR; INTRAVENOUS EVERY 6 HOURS PRN
Status: DISCONTINUED | OUTPATIENT
Start: 2025-02-24 | End: 2025-02-25 | Stop reason: HOSPADM

## 2025-02-24 RX ORDER — PROCHLORPERAZINE MALEATE 5 MG/1
10 TABLET ORAL EVERY 6 HOURS PRN
Status: DISCONTINUED | OUTPATIENT
Start: 2025-02-24 | End: 2025-02-25 | Stop reason: HOSPADM

## 2025-02-24 RX ORDER — OXYTOCIN 10 [USP'U]/ML
10 INJECTION, SOLUTION INTRAMUSCULAR; INTRAVENOUS
Status: DISCONTINUED | OUTPATIENT
Start: 2025-02-24 | End: 2025-02-27 | Stop reason: HOSPADM

## 2025-02-24 RX ORDER — IBUPROFEN 400 MG/1
800 TABLET, FILM COATED ORAL
Status: DISCONTINUED | OUTPATIENT
Start: 2025-02-24 | End: 2025-02-25 | Stop reason: HOSPADM

## 2025-02-24 RX ORDER — CITRIC ACID/SODIUM CITRATE 334-500MG
30 SOLUTION, ORAL ORAL
Status: DISCONTINUED | OUTPATIENT
Start: 2025-02-24 | End: 2025-02-25 | Stop reason: HOSPADM

## 2025-02-24 RX ADMIN — HYDROXYZINE HYDROCHLORIDE 50 MG: 25 TABLET, FILM COATED ORAL at 22:44

## 2025-02-24 RX ADMIN — MISOPROSTOL 25 MCG: 100 TABLET ORAL at 20:28

## 2025-02-24 RX ADMIN — HYDROXYZINE HYDROCHLORIDE 50 MG: 25 TABLET, FILM COATED ORAL at 20:48

## 2025-02-24 ASSESSMENT — ACTIVITIES OF DAILY LIVING (ADL)
ADLS_ACUITY_SCORE: 18

## 2025-02-25 ENCOUNTER — ANESTHESIA EVENT (OUTPATIENT)
Dept: OBGYN | Facility: CLINIC | Age: 35
End: 2025-02-25
Payer: COMMERCIAL

## 2025-02-25 ENCOUNTER — ANESTHESIA (OUTPATIENT)
Dept: OBGYN | Facility: CLINIC | Age: 35
End: 2025-02-25
Payer: COMMERCIAL

## 2025-02-25 LAB
ABO + RH BLD: NORMAL
FETAL CELL SCN BLD-IMP: NORMAL
SPECIMEN EXP DATE BLD: NORMAL
T PALLIDUM AB SER QL: NONREACTIVE

## 2025-02-25 PROCEDURE — 3E0R3BZ INTRODUCTION OF ANESTHETIC AGENT INTO SPINAL CANAL, PERCUTANEOUS APPROACH: ICD-10-PCS | Performed by: ANESTHESIOLOGY

## 2025-02-25 PROCEDURE — 999N000016 HC STATISTIC ATTENDANCE AT DELIVERY

## 2025-02-25 PROCEDURE — 722N000001 HC LABOR CARE VAGINAL DELIVERY SINGLE

## 2025-02-25 PROCEDURE — 0HQ9XZZ REPAIR PERINEUM SKIN, EXTERNAL APPROACH: ICD-10-PCS | Performed by: STUDENT IN AN ORGANIZED HEALTH CARE EDUCATION/TRAINING PROGRAM

## 2025-02-25 PROCEDURE — 258N000003 HC RX IP 258 OP 636: Performed by: OBSTETRICS & GYNECOLOGY

## 2025-02-25 PROCEDURE — 250N000011 HC RX IP 250 OP 636: Performed by: ANESTHESIOLOGY

## 2025-02-25 PROCEDURE — 250N000009 HC RX 250: Performed by: OBSTETRICS & GYNECOLOGY

## 2025-02-25 PROCEDURE — 250N000013 HC RX MED GY IP 250 OP 250 PS 637: Performed by: OBSTETRICS & GYNECOLOGY

## 2025-02-25 PROCEDURE — 370N000003 HC ANESTHESIA WARD SERVICE: Performed by: ANESTHESIOLOGY

## 2025-02-25 PROCEDURE — 120N000012 HC R&B POSTPARTUM

## 2025-02-25 PROCEDURE — 250N000009 HC RX 250: Mod: JW | Performed by: ANESTHESIOLOGY

## 2025-02-25 PROCEDURE — 250N000011 HC RX IP 250 OP 636: Performed by: OBSTETRICS & GYNECOLOGY

## 2025-02-25 PROCEDURE — 00HU33Z INSERTION OF INFUSION DEVICE INTO SPINAL CANAL, PERCUTANEOUS APPROACH: ICD-10-PCS | Performed by: ANESTHESIOLOGY

## 2025-02-25 PROCEDURE — 10907ZC DRAINAGE OF AMNIOTIC FLUID, THERAPEUTIC FROM PRODUCTS OF CONCEPTION, VIA NATURAL OR ARTIFICIAL OPENING: ICD-10-PCS | Performed by: STUDENT IN AN ORGANIZED HEALTH CARE EDUCATION/TRAINING PROGRAM

## 2025-02-25 PROCEDURE — 0UQMXZZ REPAIR VULVA, EXTERNAL APPROACH: ICD-10-PCS | Performed by: STUDENT IN AN ORGANIZED HEALTH CARE EDUCATION/TRAINING PROGRAM

## 2025-02-25 PROCEDURE — 250N000013 HC RX MED GY IP 250 OP 250 PS 637: Performed by: STUDENT IN AN ORGANIZED HEALTH CARE EDUCATION/TRAINING PROGRAM

## 2025-02-25 RX ORDER — SODIUM CHLORIDE, SODIUM LACTATE, POTASSIUM CHLORIDE, CALCIUM CHLORIDE 600; 310; 30; 20 MG/100ML; MG/100ML; MG/100ML; MG/100ML
INJECTION, SOLUTION INTRAVENOUS CONTINUOUS PRN
Status: DISCONTINUED | OUTPATIENT
Start: 2025-02-25 | End: 2025-02-25 | Stop reason: HOSPADM

## 2025-02-25 RX ORDER — LOPERAMIDE HYDROCHLORIDE 2 MG/1
2 CAPSULE ORAL
Status: DISCONTINUED | OUTPATIENT
Start: 2025-02-25 | End: 2025-02-27 | Stop reason: HOSPADM

## 2025-02-25 RX ORDER — LOPERAMIDE HYDROCHLORIDE 2 MG/1
4 CAPSULE ORAL
Status: DISCONTINUED | OUTPATIENT
Start: 2025-02-25 | End: 2025-02-27 | Stop reason: HOSPADM

## 2025-02-25 RX ORDER — METHYLERGONOVINE MALEATE 0.2 MG/ML
200 INJECTION INTRAVENOUS
Status: DISCONTINUED | OUTPATIENT
Start: 2025-02-25 | End: 2025-02-27 | Stop reason: HOSPADM

## 2025-02-25 RX ORDER — EPHEDRINE SULFATE 50 MG/ML
5 INJECTION, SOLUTION INTRAMUSCULAR; INTRAVENOUS; SUBCUTANEOUS
Status: DISCONTINUED | OUTPATIENT
Start: 2025-02-25 | End: 2025-02-25 | Stop reason: HOSPADM

## 2025-02-25 RX ORDER — MISOPROSTOL 200 UG/1
800 TABLET ORAL
Status: DISCONTINUED | OUTPATIENT
Start: 2025-02-25 | End: 2025-02-27 | Stop reason: HOSPADM

## 2025-02-25 RX ORDER — HYDROCORTISONE 25 MG/G
CREAM TOPICAL 3 TIMES DAILY PRN
Status: DISCONTINUED | OUTPATIENT
Start: 2025-02-25 | End: 2025-02-27 | Stop reason: HOSPADM

## 2025-02-25 RX ORDER — POLYETHYLENE GLYCOL 3350 17 G/17G
17 POWDER, FOR SOLUTION ORAL DAILY PRN
Status: DISCONTINUED | OUTPATIENT
Start: 2025-02-25 | End: 2025-02-27 | Stop reason: HOSPADM

## 2025-02-25 RX ORDER — ACETAMINOPHEN 325 MG/1
650 TABLET ORAL EVERY 4 HOURS PRN
Status: DISCONTINUED | OUTPATIENT
Start: 2025-02-25 | End: 2025-02-27 | Stop reason: HOSPADM

## 2025-02-25 RX ORDER — TRANEXAMIC ACID 10 MG/ML
1 INJECTION, SOLUTION INTRAVENOUS EVERY 30 MIN PRN
Status: DISCONTINUED | OUTPATIENT
Start: 2025-02-25 | End: 2025-02-27 | Stop reason: HOSPADM

## 2025-02-25 RX ORDER — BISACODYL 10 MG
10 SUPPOSITORY, RECTAL RECTAL DAILY PRN
Status: DISCONTINUED | OUTPATIENT
Start: 2025-02-25 | End: 2025-02-27 | Stop reason: HOSPADM

## 2025-02-25 RX ORDER — LIDOCAINE 40 MG/G
CREAM TOPICAL
Status: DISCONTINUED | OUTPATIENT
Start: 2025-02-25 | End: 2025-02-25 | Stop reason: HOSPADM

## 2025-02-25 RX ORDER — CARBOPROST TROMETHAMINE 250 UG/ML
250 INJECTION, SOLUTION INTRAMUSCULAR
Status: DISCONTINUED | OUTPATIENT
Start: 2025-02-25 | End: 2025-02-27 | Stop reason: HOSPADM

## 2025-02-25 RX ORDER — OXYTOCIN/0.9 % SODIUM CHLORIDE 30/500 ML
1-24 PLASTIC BAG, INJECTION (ML) INTRAVENOUS CONTINUOUS
Status: DISCONTINUED | OUTPATIENT
Start: 2025-02-25 | End: 2025-02-25

## 2025-02-25 RX ORDER — NALBUPHINE HYDROCHLORIDE 10 MG/ML
2.5-5 INJECTION INTRAMUSCULAR; INTRAVENOUS; SUBCUTANEOUS EVERY 6 HOURS PRN
Status: DISCONTINUED | OUTPATIENT
Start: 2025-02-25 | End: 2025-02-27 | Stop reason: HOSPADM

## 2025-02-25 RX ORDER — OXYTOCIN/0.9 % SODIUM CHLORIDE 30/500 ML
1-24 PLASTIC BAG, INJECTION (ML) INTRAVENOUS CONTINUOUS
Status: DISCONTINUED | OUTPATIENT
Start: 2025-02-25 | End: 2025-02-25 | Stop reason: HOSPADM

## 2025-02-25 RX ORDER — AMOXICILLIN 250 MG
2 CAPSULE ORAL
Status: DISCONTINUED | OUTPATIENT
Start: 2025-02-25 | End: 2025-02-27 | Stop reason: HOSPADM

## 2025-02-25 RX ORDER — OXYTOCIN/0.9 % SODIUM CHLORIDE 30/500 ML
340 PLASTIC BAG, INJECTION (ML) INTRAVENOUS CONTINUOUS PRN
Status: DISCONTINUED | OUTPATIENT
Start: 2025-02-25 | End: 2025-02-27 | Stop reason: HOSPADM

## 2025-02-25 RX ORDER — IBUPROFEN 400 MG/1
800 TABLET, FILM COATED ORAL EVERY 6 HOURS PRN
Status: DISCONTINUED | OUTPATIENT
Start: 2025-02-25 | End: 2025-02-27 | Stop reason: HOSPADM

## 2025-02-25 RX ORDER — OXYTOCIN 10 [USP'U]/ML
10 INJECTION, SOLUTION INTRAMUSCULAR; INTRAVENOUS
Status: DISCONTINUED | OUTPATIENT
Start: 2025-02-25 | End: 2025-02-27 | Stop reason: HOSPADM

## 2025-02-25 RX ORDER — MISOPROSTOL 200 UG/1
400 TABLET ORAL
Status: DISCONTINUED | OUTPATIENT
Start: 2025-02-25 | End: 2025-02-27 | Stop reason: HOSPADM

## 2025-02-25 RX ORDER — CYCLOBENZAPRINE HCL 5 MG
10 TABLET ORAL 3 TIMES DAILY
Status: DISCONTINUED | OUTPATIENT
Start: 2025-02-25 | End: 2025-02-27 | Stop reason: HOSPADM

## 2025-02-25 RX ADMIN — ACETAMINOPHEN 650 MG: 325 TABLET, FILM COATED ORAL at 23:23

## 2025-02-25 RX ADMIN — Medication: at 09:25

## 2025-02-25 RX ADMIN — MISOPROSTOL 25 MCG: 100 TABLET ORAL at 00:39

## 2025-02-25 RX ADMIN — MISOPROSTOL 25 MCG: 100 TABLET ORAL at 04:36

## 2025-02-25 RX ADMIN — IBUPROFEN 800 MG: 400 TABLET, FILM COATED ORAL at 23:23

## 2025-02-25 RX ADMIN — BUPROPION HYDROCHLORIDE 300 MG: 300 TABLET, EXTENDED RELEASE ORAL at 08:16

## 2025-02-25 RX ADMIN — SODIUM CHLORIDE, POTASSIUM CHLORIDE, SODIUM LACTATE AND CALCIUM CHLORIDE: 600; 310; 30; 20 INJECTION, SOLUTION INTRAVENOUS at 10:24

## 2025-02-25 RX ADMIN — CYCLOBENZAPRINE 10 MG: 5 TABLET, FILM COATED ORAL at 23:23

## 2025-02-25 RX ADMIN — LIDOCAINE HYDROCHLORIDE 20 ML: 10 INJECTION, SOLUTION INFILTRATION; PERINEURAL at 15:55

## 2025-02-25 RX ADMIN — ONDANSETRON 4 MG: 2 INJECTION, SOLUTION INTRAMUSCULAR; INTRAVENOUS at 09:58

## 2025-02-25 RX ADMIN — SODIUM CHLORIDE, POTASSIUM CHLORIDE, SODIUM LACTATE AND CALCIUM CHLORIDE 500 ML: 600; 310; 30; 20 INJECTION, SOLUTION INTRAVENOUS at 08:53

## 2025-02-25 RX ADMIN — Medication 2 MILLI-UNITS/MIN: at 09:55

## 2025-02-25 RX ADMIN — IBUPROFEN 800 MG: 400 TABLET, FILM COATED ORAL at 17:17

## 2025-02-25 RX ADMIN — FENTANYL CITRATE 100 MCG: 50 INJECTION INTRAMUSCULAR; INTRAVENOUS at 07:40

## 2025-02-25 RX ADMIN — ACETAMINOPHEN 650 MG: 325 TABLET, FILM COATED ORAL at 17:17

## 2025-02-25 RX ADMIN — Medication: at 14:29

## 2025-02-25 RX ADMIN — EPHEDRINE SULFATE 5 MG: 5 INJECTION INTRAVENOUS at 09:36

## 2025-02-25 RX ADMIN — FENTANYL CITRATE 100 MCG: 50 INJECTION INTRAMUSCULAR; INTRAVENOUS at 06:35

## 2025-02-25 RX ADMIN — CYCLOBENZAPRINE 10 MG: 5 TABLET, FILM COATED ORAL at 14:40

## 2025-02-25 RX ADMIN — METOCLOPRAMIDE 10 MG: 5 INJECTION, SOLUTION INTRAMUSCULAR; INTRAVENOUS at 13:54

## 2025-02-25 ASSESSMENT — ACTIVITIES OF DAILY LIVING (ADL)
ADLS_ACUITY_SCORE: 18
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
## 2025-02-25 NOTE — PLAN OF CARE
Patient A&Ox4, VSS on RA. Up IND in room. Coping with labor via breathing and nitrous oxide. S.O. attentive at bedside. Patient plans an eventual epidural. 3 doses of vaginal cytotec given, douglas score 8 at 0445. Discussed typical birth proceedings on the unit. Patient denied having questions. Patient requested and was given advance directive paperwork. FHT CAT1 on dilia monitor. Delivery table, and baby warmer are prepped for delivery.

## 2025-02-25 NOTE — PROVIDER NOTIFICATION
02/25/25 1415   Provider Notification   Provider Name/Title MD Slaughter   Method of Notification Phone     MD updated on pt status- ordered Flexeril for patient. Give and wait 20 min to kick in, then begin pushing again. MD aware that pts HR is in the 150s d/t neck pain and panic attack

## 2025-02-25 NOTE — PLAN OF CARE
Patient is a G3 now P1 who delivered a baby GIRL  at 1544. 41w1d GA via .  Apgars 8/9  QBL: 76ml at delivery  Laceration: 1st degree, left labial, both repaired  Medications given at delivery: IV pitocin per protocol.   Vitals stable. Routine postpartum course.   Supportive , Tai,  at bedside. Bonding well with .

## 2025-02-25 NOTE — PROVIDER NOTIFICATION
02/25/25 1139   Provider Notification   Provider Name/Title MD Slaughter   Method of Notification At Bedside   Request Evaluate in Person   Notification Reason SVE;Labor Status;Membrane Status     MD Biegner at bedside for AROM. SVE 6/90/0. Ruptured for small amount of clear fluid. Pt comfortable with epidural. Continue on pitocin

## 2025-02-25 NOTE — L&D DELIVERY NOTE
Vaginal Delivery Procedure Note     Delivery provider: Brittany Slaughter MD ; [facility]     Delivery date/time: 2025     Preop Dx:   IUP at 41w1d  Anxiety/depression    Postop Dx:   Same as above     Procedure: Spontaneous Vaginal Delivery     Anesthesia:  Epidural    Delivery QBL (mL): 76     Specimens: cord blood      Findings: VFI, cephalic presentation, moderate meconium fluid, single nuchal cord, 3-vessel cord, Apgars 8/9 at 1 and 5 minutes respectively, Fetal weight pending for maternal skin to skin, First degree perineal laceration           Labor Course: Fiona Henry is a 34 year old  at 41w1d for induction of labor for late term pregnancy. She received cytotec for cervical ripening then pitocin, had AROM initially clear fluid then meconium during labor. Reached complete.     Fetal tracing 1 throughout labor and 1 during pushing efforts to delivery     Delivery details:  At bedside. Patient complete and pushing. With excellent maternal expulsive efforts, the infant's head delivered. Examined for nuchal cord and none noted. Shoulders followed without force or delay. Delivered VFI to mother's abdomen. Cord clamped and cut after 60 seconds; 3VC. Cord blood collected and sent.  Placenta delivered via gentle traction and fundal massage. Perineum examined and 1st degree laceration noted and repaired in standard fashion. Remainder of exam showed left labial laceration repaired in standard fashion. EBL 76cc. Apgars 8/9 at 1 and 5 minutes respectively. Vaginal counts correct. Fundus firm at -1 with scant/normal flow after.    Sponge and needle count were correct.      stable with mother admitted to NBN. Mother stable in delivery room.     Brittany Slaughter MD  [facility]

## 2025-02-25 NOTE — PROVIDER NOTIFICATION
02/24/25 2015   Provider Notification   Provider Name/Title Kate RAVI   Method of Notification Electronic Page;Phone   Request Evaluate - Remote   Notification Reason SVE;Other (Comment)  (Patient 1/60/-2. Hale 7. Admission and cervical ripenning orders rec'sara. kate aware pt eva q4-5min. Writer reviewed preferred cytotec administration route (patient stated okay with vaginal cytotec).)     As per Dr. Samuel pt okay to receive first dose of cytotec vaginally, to reassess contractions for second dose and possibly switch to cervidil is eva frequently. Pt okay to have anything she wants for pain as per Dr. Samuel.

## 2025-02-25 NOTE — ANESTHESIA PROCEDURE NOTES
"Epidural catheter Procedure Note    Pre-Procedure   Staff -        Anesthesiologist:  She Malagon MD       Performed By: anesthesiologist       Location: OB       Procedure Start/Stop Times: 2/25/2025 9:19 AM and 2/25/2025 9:36 AM       Pre-Anesthestic Checklist: patient identified, IV checked, risks and benefits discussed, informed consent, monitors and equipment checked, pre-op evaluation, at physician/surgeon's request and post-op pain management  Timeout:       Correct Patient: Yes        Correct Procedure: Yes        Correct Site: Yes        Correct Position: Yes   Procedure Documentation  Procedure: epidural catheter         Patient Position: sitting       Patient Prep/Sterile Barriers: sterile gloves, mask, patient draped       Skin prep: Chloraprep       Local skin infiltrated with 2 mL of 1% lidocaine.        Insertion Site: L3-4. (midline approach).       Technique: LORT saline        Needle Type: Teladocy needle       Needle Gauge: 18.        Needle Length (Inches): 5        Catheter: 19 G.          Catheter threaded easily.           Threaded 15 cm at skin.         # of attempts: 1 and  # of redirects:     Assessment/Narrative         Paresthesias: No.       Test dose of 5 mL lidocaine 1.5% w/ 1:200,000 epinephrine at.         Test dose negative, 3 minutes after injection, for signs of intravascular, subdural, or intrathecal injection.       Insertion/Infusion Method: LORT saline       Aspiration negative for Heme or CSF via Epidural Catheter.    Medication(s) Administered   0.125% Bupivacaine + 2 mcg/mL Fentanyl via CADD (Epidural) - EPIDURAL   10 mL - 2/25/2025 9:19:00 AM  Medication Administration Time: 2/25/2025 9:19 AM      FOR [facility] ([facility] [place]) ONLY:   Pain Team Contact information: please page the Pain Team Via [employer]. Search \"Pain\". During daytime hours, please page the attending first. At night please page the resident first.      "

## 2025-02-25 NOTE — H&P
OB HISTORY AND PHYSICAL    Patient Name: Fiona Henry   Admit Date: 224  MR #: [id number]   Acct #: [id number]   : 1990     Chief Complaint/Reason for Visit: induction of labor    History of Present Illness: Fiona Henry is a 34 year old  at 41w1d here for induction of labor for late term pregnancy. +FM, denies ctx, vb, lof.    Fiona's prenatal care is notable for low risk NIPS, XX (pt aware), horizon carrier screen low risk. AFP neg. Anatomy US wnl. Passed 1hr gtt. GBS neg. Hx anxiety/depression on wellbutrin and hydroxyzine. Rh negative, s/p rhogam.     Review of Systems:  General: denies fevers  CV: denies CP  Pulm: denies SOB  Neuro: denies HA  Abd: denies N/V  Gyn: denies vaginal discharge  Skin: denies rashes  MSK: denies calf pain  Psych: denies depression     History:   OB History    Para Term  AB Living   3 0 0 0 2 0   SAB IAB Ectopic Multiple Live Births   2 0 0 0 0      # Outcome Date GA Lbr Ibrahima/2nd Weight Sex Type Anes PTL Lv   3 Current            2 2024 5w0d    SAB      1 2022 8w0d    SAB          Past Medical History:   Diagnosis Date    Anxiety     Depressive disorder Life long    Consistently goes to psychiatrist for the past 10 years    Hashimoto's thyroiditis     PCOS (polycystic ovarian syndrome)        Past Surgical History:   Procedure Laterality Date    adnoidectomy Bilateral     [facility] REMOVAL OF ANAL FISSURE      ~Dec 2020; [facility]    tonsillectomy Bilateral        Family History   Problem Relation Age of Onset    Bipolar Disorder Mother     Depression Mother         Major depressive disorder, sometimes hospitalized and treated with ECT    Diabetes Father         Type II    Heart Disease Father         Hx Quadruple Bypass, age 54    Depression Maternal Grandfather         Bipolar, hospitalized sometimes, teeated with ECT    Skin Cancer Maternal Grandfather         SCC or BCC    Other Cancer Paternal Grandfather         Lung cancer  and heart attacks    Diabetes Paternal Grandfather         Type II    Diabetes Cousin         Type 1.5, diagnosed last year at age 32    Breast Cancer No family hx of     Colon Cancer No family hx of        Social History     Socioeconomic History    Marital status:      Spouse name: Not on file    Number of children: Not on file    Years of education: Not on file    Highest education level: Not on file   Occupational History    Not on file   Tobacco Use    Smoking status: Never    Smokeless tobacco: Never   Vaping Use    Vaping status: Never Used   Substance and Sexual Activity    Alcohol use: Not Currently     Comment: Rarely    Drug use: No    Sexual activity: Yes     Partners: Male     Birth control/protection: None   Other Topics Concern    Parent/sibling w/ CABG, MI or angioplasty before 65F 55M? Yes     Comment: Father had heart attack and quadruple bypass around age 54   Social History Narrative    Not on file     Social Drivers of Health     Financial Resource Strain: Low Risk  (2/25/2025)    Financial Resource Strain     Within the past 12 months, have you or your family members you live with been unable to get utilities (heat, electricity) when it was really needed?: No   Food Insecurity: Low Risk  (2/25/2025)    Food Insecurity     Within the past 12 months, did you worry that your food would run out before you got money to buy more?: No     Within the past 12 months, did the food you bought just not last and you didn t have money to get more?: No   Transportation Needs: Low Risk  (2/25/2025)    Transportation Needs     Within the past 12 months, has lack of transportation kept you from medical appointments, getting your medicines, non-medical meetings or appointments, work, or from getting things that you need?: No   Physical Activity: Sufficiently Active (4/12/2024)    Exercise Vital Sign     Days of Exercise per Week: 5 days     Minutes of Exercise per Session: 30 min   Stress: No Stress  Concern Present (4/12/2024)    Citizen of [place] [employer] - Occupational Stress Questionnaire     Feeling of Stress : Only a little   Social Connections: Unknown (4/12/2024)    Social Connection and Isolation Panel [NHANES]     Frequency of Communication with Friends and Family: Not on file     Frequency of Social Gatherings with Friends and Family: Once a week     Attends Hoahaoism Services: Not on file     Active Member of Clubs or Organizations: Not on file     Attends Club or Organization Meetings: Not on file     Marital Status: Not on file   Interpersonal Safety: Unknown (2/25/2025)    Interpersonal Safety     Do you feel physically and emotionally safe where you currently live?: Patient unable to answer     Within the past 12 months, have you been hit, slapped, kicked or otherwise physically hurt by someone?: Patient unable to answer     Within the past 12 months, have you been humiliated or emotionally abused in other ways by your partner or ex-partner?: Patient unable to answer   Housing Stability: Low Risk  (2/25/2025)    Housing Stability     Do you have housing? : Yes     Are you worried about losing your housing?: No       Allergy Information:        I have reviewed the patient's allergies.      ALLERGY@    Home Medications:   No current outpatient medications on file.       Physical Examination:  Vitals:  Temp:  [97.3  F (36.3  C)-101  F (38.3  C)] 101  F (38.3  C)  Resp:  [16-22] 22  BP: ()/(50-80) 121/66  SpO2:  [95 %-100 %] 96 %    Exam:  General: no acute distress  Head: normocephalic, atraumatic  Eyes: EOMI  CV: pulses intact  Pulm: no increased effort  Neuro: CN II-XII grossly intact  Abd: gravid, soft, NTTP  Gyn: 6/90/0  Skin: no rashes  MSK: no calf tenderness  Psych: AOx3, appropriate mood/affect    Fetus: FHT: 145, moderate variability, positive accels, no decels; Shambaugh: q2-3min ctx.    Laboratory and Additional Data Reviewed:  Any associated labs, path, imaging personally  reviewed  Results for orders placed or performed during the hospital encounter of 02/24/25   CBC with platelets     Status: Abnormal   Result Value Ref Range    WBC Count 10.8 4.0 - 11.0 10e3/uL    RBC Count 3.76 (L) 3.80 - 5.20 10e6/uL    Hemoglobin 12.1 11.7 - 15.7 g/dL    Hematocrit 35.1 35.0 - 47.0 %    MCV 93 78 - 100 fL    MCH 32.2 26.5 - 33.0 pg    MCHC 34.5 31.5 - 36.5 g/dL    RDW 12.9 10.0 - 15.0 %    Platelet Count 183 150 - 450 10e3/uL   Treponema Abs w Reflex to RPR and Titer     Status: Normal   Result Value Ref Range    Treponema Antibody Total Nonreactive Nonreactive   Adult Type and Screen     Status: None   Result Value Ref Range    ABO/RH(D) B NEG     Antibody Screen Negative Negative    SPECIMEN EXPIRATION DATE [id number]    Chlamydia Trachomatis PCR (External Result)     Status: None   Result Value Ref Range    Specimen Description      Chlamydia Trachomatis PCR Negative Negative   Gonorrhea (External Result)     Status: None   Result Value Ref Range    Specimen Descrip      N Gonorrhea PCR Negative Negative   Treponema Pallidum Antibody (RPR) (External Result)     Status: None   Result Value Ref Range    Treponema Palldum Antibody (External) Nonreactive Nonreactive   Hepatitis B Surface Antigen (External Result)     Status: None   Result Value Ref Range    Hepatitis B Surface Antigen (External) Negative Nonreactive   Rubella Antibody IgG (External Result)     Status: None   Result Value Ref Range    Rubella Antibody IgG (External) Immune Nonreactive   Group B Streptococcus (External Result)     Status: None   Result Value Ref Range    Group B Streptococcus (External) Negative Negative   ABO/Rh type and screen     Status: None    Narrative    The following orders were created for panel order ABO/Rh type and screen.  Procedure                               Abnormality         Status                     ---------                               -----------         ------                     Adult  Type and Screen[[id number]]                            Final result                 Please view results for these tests on the individual orders.         Assessment and Plan: Fiona Henry is a 34 year old  at 41w1d here for induction of labor    IOL  - s/p cytotec overnight  - start pitocin per protocol  - AROM clear fluid  - Pt comfortable with epidural  - GBS neg  - anticipate vaginal delivery    Anxiety/Depression  - wellbutrin and hydroxyzine    Dispo: admit to L&D for IOL    Brittany Slaughter MD  [facility] [place]  2025, 4:05 PM

## 2025-02-25 NOTE — ANESTHESIA PREPROCEDURE EVALUATION
"Anesthesia Pre-Procedure Evaluation    Patient: Fiona Henry   MRN: [id number] : 1990        Procedure : * No procedures listed *          Past Medical History:   Diagnosis Date     Anxiety      Depressive disorder Life long    Consistently goes to psychiatrist for the past 10 years     Hashimoto's thyroiditis      PCOS (polycystic ovarian syndrome)       Past Surgical History:   Procedure Laterality Date     adnoidectomy Bilateral      [facility] REMOVAL OF ANAL FISSURE      ~Dec 2020; [facility]     tonsillectomy Bilateral       No Known Allergies   Social History     Tobacco Use     Smoking status: Never     Smokeless tobacco: Never   Substance Use Topics     Alcohol use: Not Currently     Comment: Rarely      Wt Readings from Last 1 Encounters:   25 79.4 kg (175 lb)        Anesthesia Evaluation            ROS/MED HX  ENT/Pulmonary:       Neurologic:       Cardiovascular:    (-) PIH   METS/Exercise Tolerance:     Hematologic:       Musculoskeletal:       GI/Hepatic:       Renal/Genitourinary:       Endo:     (+)          thyroid problem,            Psychiatric/Substance Use:       Infectious Disease:       Malignancy:       Other:          Physical Exam    Airway        Mallampati: II   TM distance: > 3 FB   Neck ROM: full   Mouth opening: > 3 cm    Respiratory Devices and Support         Dental           Cardiovascular             Pulmonary               OUTSIDE LABS:  CBC:   Lab Results   Component Value Date    WBC 10.8 2025    HGB 12.1 2025    HCT 35.1 2025     2025     BMP:   Lab Results   Component Value Date    GLC 84 2019     COAGS: No results found for: \"PTT\", \"INR\", \"FIBR\"  POC: No results found for: \"BGM\", \"HCG\", \"HCGS\"  HEPATIC: No results found for: \"ALBUMIN\", \"PROTTOTAL\", \"ALT\", \"AST\", \"GGT\", \"ALKPHOS\", \"BILITOTAL\", \"BILIDIRECT\", \"MONIQUE\"  OTHER:   Lab Results   Component Value Date    TSH 0.97 2022       Anesthesia Plan    ASA Status: "  2       Anesthesia Type: Epidural.              Consents    Anesthesia Plan(s) and associated risks, benefits, and realistic alternatives discussed. Questions answered and patient/representative(s) expressed understanding.     - Discussed:     - Discussed with:  Patient            Postoperative Care            Comments:             She Malagon MD    I have reviewed the pertinent notes and labs in the chart from the past 30 days and (re)examined the patient.  Any updates or changes from those notes are reflected in this note.    Clinically Significant Risk Factors Present on Admission

## 2025-02-25 NOTE — PROVIDER NOTIFICATION
02/25/25 1410   Provider Notification   Provider Name/Title MD Slaughter   Method of Notification Electronic Page     Started pushing at 1400. Pt unable to focus on pushing d/t neck cramp. Requesting order for Flexeril. RN contacted MD Slaughter for order.

## 2025-02-25 NOTE — PROVIDER NOTIFICATION
02/25/25 0646   Provider Notification   Provider Name/Title Lizzie RAVI   Method of Notification Electronic Page;Phone   Request Evaluate - Remote   Notification Reason SVE;Status Update     Updated MD on SVE & douglas as of 0445, ok to start pitocin at 0845. Biegner will take over at 0800.

## 2025-02-25 NOTE — PROVIDER NOTIFICATION
02/25/25 0824   Provider Notification   Provider Name/Title MD Slaughter   Method of Notification At Bedside   Request Evaluate in Person   Notification Reason Labor Status;Uterine Activity;Status Update     MD Slaughter at bedside this AM to see patient. Plan for the day is to eat breakfast, get epidural, start Pitocin if needed. MD Slaughter will stop back around 1030 to assess for AROM.

## 2025-02-26 PROCEDURE — 36415 COLL VENOUS BLD VENIPUNCTURE: CPT | Performed by: STUDENT IN AN ORGANIZED HEALTH CARE EDUCATION/TRAINING PROGRAM

## 2025-02-26 PROCEDURE — 250N000013 HC RX MED GY IP 250 OP 250 PS 637: Performed by: STUDENT IN AN ORGANIZED HEALTH CARE EDUCATION/TRAINING PROGRAM

## 2025-02-26 PROCEDURE — 250N000013 HC RX MED GY IP 250 OP 250 PS 637: Performed by: OBSTETRICS & GYNECOLOGY

## 2025-02-26 PROCEDURE — 250N000011 HC RX IP 250 OP 636: Performed by: STUDENT IN AN ORGANIZED HEALTH CARE EDUCATION/TRAINING PROGRAM

## 2025-02-26 PROCEDURE — 85461 HEMOGLOBIN FETAL: CPT | Performed by: STUDENT IN AN ORGANIZED HEALTH CARE EDUCATION/TRAINING PROGRAM

## 2025-02-26 PROCEDURE — 86900 BLOOD TYPING SEROLOGIC ABO: CPT | Performed by: STUDENT IN AN ORGANIZED HEALTH CARE EDUCATION/TRAINING PROGRAM

## 2025-02-26 PROCEDURE — 120N000012 HC R&B POSTPARTUM

## 2025-02-26 RX ADMIN — ACETAMINOPHEN 650 MG: 325 TABLET, FILM COATED ORAL at 04:45

## 2025-02-26 RX ADMIN — BUPROPION HYDROCHLORIDE 300 MG: 300 TABLET, EXTENDED RELEASE ORAL at 08:26

## 2025-02-26 RX ADMIN — CYCLOBENZAPRINE 10 MG: 5 TABLET, FILM COATED ORAL at 16:42

## 2025-02-26 RX ADMIN — IBUPROFEN 800 MG: 400 TABLET, FILM COATED ORAL at 16:48

## 2025-02-26 RX ADMIN — IBUPROFEN 800 MG: 400 TABLET, FILM COATED ORAL at 23:59

## 2025-02-26 RX ADMIN — IBUPROFEN 800 MG: 400 TABLET, FILM COATED ORAL at 04:45

## 2025-02-26 RX ADMIN — PSYLLIUM HUSK 1 PACKET: 3.4 POWDER ORAL at 08:26

## 2025-02-26 RX ADMIN — CYCLOBENZAPRINE 10 MG: 5 TABLET, FILM COATED ORAL at 21:35

## 2025-02-26 RX ADMIN — HUMAN RHO(D) IMMUNE GLOBULIN 300 MCG: 1500 SOLUTION INTRAMUSCULAR; INTRAVENOUS at 16:43

## 2025-02-26 RX ADMIN — ACETAMINOPHEN 650 MG: 325 TABLET, FILM COATED ORAL at 23:59

## 2025-02-26 RX ADMIN — CYCLOBENZAPRINE 10 MG: 5 TABLET, FILM COATED ORAL at 08:26

## 2025-02-26 RX ADMIN — ACETAMINOPHEN 650 MG: 325 TABLET, FILM COATED ORAL at 16:48

## 2025-02-26 RX ADMIN — IBUPROFEN 800 MG: 400 TABLET, FILM COATED ORAL at 11:04

## 2025-02-26 RX ADMIN — ACETAMINOPHEN 650 MG: 325 TABLET, FILM COATED ORAL at 11:04

## 2025-02-26 ASSESSMENT — ACTIVITIES OF DAILY LIVING (ADL)
ADLS_ACUITY_SCORE: 22
ADLS_ACUITY_SCORE: 22
ADLS_ACUITY_SCORE: 18
ADLS_ACUITY_SCORE: 23
ADLS_ACUITY_SCORE: 18
ADLS_ACUITY_SCORE: 22
ADLS_ACUITY_SCORE: 18
ADLS_ACUITY_SCORE: 23
ADLS_ACUITY_SCORE: 18
ADLS_ACUITY_SCORE: 18
ADLS_ACUITY_SCORE: 22
ADLS_ACUITY_SCORE: 23
ADLS_ACUITY_SCORE: 18
ADLS_ACUITY_SCORE: 18
ADLS_ACUITY_SCORE: 23
ADLS_ACUITY_SCORE: 22
ADLS_ACUITY_SCORE: 18
ADLS_ACUITY_SCORE: 23
ADLS_ACUITY_SCORE: 23
ADLS_ACUITY_SCORE: 22
ADLS_ACUITY_SCORE: 23

## 2025-02-26 NOTE — PLAN OF CARE
Vital signs stable. Postpartum assessment WDL. Pain controlled with tylenol and ibuprofen. Also taking flexeril for neck pain. Rhogam given. IV removed. Breastfeeding on cue with minimal assist. Patient and infant bonding well.  present and supportive. Will continue with current plan of care.   Goal Outcome Evaluation:      Plan of Care Reviewed With: patient, spouse    Overall Patient Progress: improvingOverall Patient Progress: improving

## 2025-02-26 NOTE — PROGRESS NOTES
"Fiona R Carl  2025    S: pt doing well.  Pain well controlled,  Ambulating without difficulty.  Tolerating po intake.  Decreased lochia.  Breast feeding.    O: /68   Pulse 95   Temp 97.7  F (36.5  C) (Oral)   Resp 16   Ht 1.6 m (5' 3\")   Wt 79.4 kg (175 lb)   LMP 04/15/2024 (Exact Date)   SpO2 97%   Breastfeeding Unknown   BMI 31.00 kg/m    Recent Labs   Lab 25   HGB 12.1     Abdomen: soft, non tender, fundus firm below the umbilicus  Ext: non tender, no edema or erythema    A/P: Fiona R Carl  s/p  PPD #1  Doing well  Continue routine post partum care  Discharge planning for tomorrow per pt preference    Hernandez Hunt MD  "

## 2025-02-26 NOTE — ANESTHESIA POSTPROCEDURE EVALUATION
Patient: Fiona Henry    Procedure: * No procedures listed *       Anesthesia Type:  Epidural    Note:  Disposition: Inpatient   Postop Pain Control: Uneventful            Sign Out: Well controlled pain   PONV: No   Neuro/Psych: Uneventful            Sign Out: Acceptable/Baseline neuro status   Airway/Respiratory: Uneventful            Sign Out: Acceptable/Baseline resp. status   CV/Hemodynamics: Uneventful            Sign Out: Acceptable CV status   Other NRE: NONE   DID A NON-ROUTINE EVENT OCCUR? No    Event details/Postop Comments:  Patient's epidural worked well for labor pain control. She is ambulating and has no bowel/bladder dysfunction. No paresthesia.  Minor/Insignificant insertion site pain.            Last vitals:  Vitals:    02/26/25 0759 02/26/25 1230 02/26/25 1600   BP: 102/68 107/68 112/74   Pulse: 95 109 103   Resp: 16 16 16   Temp: 36.5  C (97.7  F)  36.4  C (97.6  F)   SpO2:          Electronically Signed By: Sagar Pinto MD  February 26, 2025  5:43 PM

## 2025-02-26 NOTE — PROGRESS NOTES
Data: Fiona Henry transferred from L&D via wheelchair to room 424 at 1825. Infant transferred via parent's arms.  Action: Report received from ROCÍO Seals.  Accompanied by Registered Nurse. Oriented family to surroundings. Call light within reach. ID bands double-checked with transferring RN and parents  Response: Patient tolerated transfer and is stable.

## 2025-02-26 NOTE — PLAN OF CARE
Vital signs stable. Working on breastfeeding every 2-3 hours or on demand with minimal assist. Fundus firm and midline with scant flow. Using ice and tucks pads with good relief. Pain well controlled with tylenol and ibuprofen. Patient up independent in the room and ambulation encouraged in hallways. Voiding spontaneously without difficulty. Spouse at bedside involved in care. Education given and questions answered. Will continue with the current plan of care.

## 2025-02-26 NOTE — PLAN OF CARE
Goal Outcome Evaluation:      Plan of Care Reviewed With: patient, spouse    Overall Patient Progress: improvingOverall Patient Progress: improving    Vital signs stable. Postpartum assessment WDL, fundus firm at U with scant flow. Pain controlled with tylenol and ibuprofen. Patient voiding and ambulating without difficulty; due to void once more. Breastfeeding every 2-3 hours. Patient and infant bonding well. Spouse at bedside; very supportive of patient. Plan of care ongoing.

## 2025-02-26 NOTE — PLAN OF CARE
Data: Fiona Henry transferred to 424 via wheelchair at 1825. Baby transferred via parent's arms.  Action: Receiving unit notified of transfer: Yes. Patient and family notified of room change. Report given to Kathya Cosme RN at 1825. Belongings sent to receiving unit. Accompanied by Registered Nurse. Oriented patient to surroundings. Call light within reach. ID bands double-checked with receiving RN.  Response: Patient tolerated transfer and is stable.

## 2025-02-27 VITALS
HEART RATE: 106 BPM | TEMPERATURE: 97.8 F | HEIGHT: 63 IN | RESPIRATION RATE: 16 BRPM | WEIGHT: 175.04 LBS | OXYGEN SATURATION: 97 % | BODY MASS INDEX: 31.02 KG/M2 | SYSTOLIC BLOOD PRESSURE: 120 MMHG | DIASTOLIC BLOOD PRESSURE: 84 MMHG

## 2025-02-27 PROCEDURE — 250N000013 HC RX MED GY IP 250 OP 250 PS 637: Performed by: STUDENT IN AN ORGANIZED HEALTH CARE EDUCATION/TRAINING PROGRAM

## 2025-02-27 PROCEDURE — 250N000013 HC RX MED GY IP 250 OP 250 PS 637: Performed by: OBSTETRICS & GYNECOLOGY

## 2025-02-27 RX ORDER — AMOXICILLIN 250 MG
2 CAPSULE ORAL
COMMUNITY
Start: 2025-02-27

## 2025-02-27 RX ORDER — ACETAMINOPHEN 325 MG/1
650 TABLET ORAL EVERY 4 HOURS PRN
COMMUNITY
Start: 2025-02-27

## 2025-02-27 RX ORDER — IBUPROFEN 800 MG/1
800 TABLET, FILM COATED ORAL EVERY 6 HOURS PRN
COMMUNITY
Start: 2025-02-27

## 2025-02-27 RX ADMIN — ACETAMINOPHEN 650 MG: 325 TABLET, FILM COATED ORAL at 08:27

## 2025-02-27 RX ADMIN — PSYLLIUM HUSK 1 PACKET: 3.4 POWDER ORAL at 08:28

## 2025-02-27 RX ADMIN — BUPROPION HYDROCHLORIDE 300 MG: 300 TABLET, EXTENDED RELEASE ORAL at 08:27

## 2025-02-27 RX ADMIN — IBUPROFEN 800 MG: 400 TABLET, FILM COATED ORAL at 08:27

## 2025-02-27 ASSESSMENT — ACTIVITIES OF DAILY LIVING (ADL)
ADLS_ACUITY_SCORE: 23

## 2025-02-27 NOTE — LACTATION NOTE
Initial visit with WOOD Jaimes and baby.   Breastfeeding general information reviewed.   Advised to breastfeed exclusively, on demand, avoid pacifiers, bottles and formula unless medically indicated.  Encouraged rooming in, skin to skin, feeding on demand 8-12x/day or sooner if baby cues.  Explained benefits of holding and skin to skin.  Encouraged lots of skin to skin. Instructed on hand expression. Baby latching well to the left breast in football hold and lips flanged. Discussed nutritive suckling pattern with the parents and swallows seen and heard at this nursing session.  Questions answered regarding pumping and physiology of milk supply and production  has a Spectra pump for home.  Outpatient resources reviewed and planning to follow up up [facility].   Instructed on signs/symptoms of engorgement/ plugged ducts and mastitis.  Instructed on comfort measures and when to call MD.  Getting ready for discharge.  Plan: Watch for feeding cues and feed every 2-3 hours and/or on demand. Continue to use feeding log to track intake and appropriate voids and stools. Take feeding log to first follow up appointment or weight check. Encourage skin to skin to promote frequent feedings, thermoregulation and bonding. Follow-up with healthcare provider or lactation consultant for questions or concerns.    Continues to nurse well per mom. No further questions at this time.   Will follow as needed.   Ammy Mccartney BSN, RN, PHN, RNC-MNN, IBCLC

## 2025-02-27 NOTE — PROGRESS NOTES
[facility]       DAILY NOTE - POSTPARTUM DAY 2     SUBJECTIVE:     Pain controlled? Yes  Tolerating a regular diet? YES  Ambulating? YES  Voiding without difficulty? Yes    OBJECTIVE:  Vitals:    25 0759 25 1230 25 1600 25 2358   BP: 102/68 107/68 112/74 103/71   BP Location: Left arm Left arm Left arm Left arm   Patient Position: Semi-Zazueta's Semi-Zazueta's Semi-Zazueta's    Cuff Size: Adult Regular Adult Regular Adult Regular    Pulse: 95 109 103 82   Resp: 16 16 16 16   Temp: 97.7  F (36.5  C)  97.6  F (36.4  C) 97.8  F (36.6  C)   TempSrc: Oral  Oral Oral   SpO2:       Weight:       Height:           Constitutional: healthy, alert, and no distress    Abdomen:  Uterine fundus is firm, non-tender and at the level of the umbilicus     Lower extremities: trace edema      LABS:  Hemoglobin   Date Value Ref Range Status   2025 12.1 11.7 - 15.7 g/dL Final       ASSESSMENT:  Post-partum day #2 s/p   Pregnancy complicated by NO COMPLICATIONS    Doing well.       PLAN:   Discharge today.  Return to clinic in 6 weeks.   Continue routine postpartum cares    Elsy Verde PA-C

## 2025-02-27 NOTE — DISCHARGE INSTRUCTIONS
Warning Signs after Having a Baby    Keep this paper on your fridge or somewhere else where you can see it.    Call your provider if you have any of these symptoms up to 12 weeks after having your baby.    Thoughts of hurting yourself or your baby  Pain in your chest or trouble breathing  Severe headache not helped by pain medicine  Eyesight concerns (blurry vision, seeing spots or flashes of light, other changes to eyesight)  Fainting, shaking or other signs of a seizure    Call 9-1-1 if you feel that it is an emergency.     The symptoms below can happen to anyone after giving birth. They can be very serious. Call your provider if you have any of these warning signs.    My provider s phone number: _______________________    Losing too much blood (hemorrhage)    Call your provider if you soak through a pad in less than an hour or pass blood clots bigger than a golf ball. These may be signs that you are bleeding too much.    Blood clots in the legs or lungs    After you give birth, your body naturally clots its blood to help prevent blood loss. Sometimes this increased clotting can happen in other areas of the body, like the legs or lungs. This can block your blood flow and be very dangerous.     Call your provider if you:  Have a red, swollen spot on the back of your leg that is warm or painful when you touch it.   Are coughing up blood.     Infection    Call your provider if you have any of these symptoms:  Fever of 100.4 F (38 C) or higher.  Pain or redness around your stitches if you had an incision.   Any yellow, white, or green fluid coming from places where you had stitches or surgery.    Mood Problems (postpartum depression)    Many people feel sad or have mood changes after having a baby. But for some people, these mood swings are worse.     Call your provider right away if you feel so anxious or nervous that you can't care for yourself or your baby.    Preeclampsia (high blood pressure)    Even if you  "didn't have high blood pressure when you were pregnant, you are at risk for the high blood pressure disease called preeclampsia. This risk can last up to 12 weeks after giving birth.     Call your provider if you have:   Pain on your right side under your rib cage  Sudden swelling in the hands and face    Remember: You know your body. If something doesn't feel right, get medical help.     For informational purposes only. Not to replace the advice of your health care provider. Copyright 2020 [facility] [facility]. All rights reserved. Clinically reviewed by Radha Fu, RNC-OB, MSN. [facility] [id number] - Rev .    Postpartum Care at Home With Your Baby: Care Instructions  Overview     After childbirth (postpartum period), your body goes through many changes as you recover. In these weeks after delivery, try to take good care of yourself. Get rest whenever you can and accept help from others.  It may take 4 to 6 weeks to feel like yourself again, and possibly longer if you had a  birth. You may feel sore or very tired as you recover. After delivery, you may continue to have contractions as the uterus returns to the size it was before your pregnancy. You will also have some vaginal bleeding. And you may have pain around the vagina as you heal. Several days after delivery you may also have pain and swelling in your breasts as they fill with milk. There are things you can do at home to help ease these discomforts.  After childbirth, it's common to feel emotional. You may feel irritable, cry easily, and feel happy one minute and sad the next. This is called the \"baby blues.\" Hormone changes are one cause of these emotional changes. These feelings usually get better within a couple of weeks. If they don't, talk to your doctor or midwife.  In the first couple of weeks after you give birth, your doctor or midwife may want to check in with you and make a plan for follow-up care. You will likely have a " complete postpartum visit in the first 3 months after delivery. At that time, your doctor or midwife will check on your recovery and see how you're doing. But if you have questions or concerns before then, you can always call your doctor or midwife.  Follow-up care is a key part of your treatment and safety. Be sure to make and go to all appointments, and call your doctor if you are having problems. It's also a good idea to know your test results and keep a list of the medicines you take.  How can you care for yourself at home?  Taking care of your body  Use pads instead of tampons for bleeding. After birth, you will have bloody vaginal discharge. You may also pass some blood clots that shouldn't be bigger than an egg. Over the next 6 weeks or so, your bleeding should decrease a little every day and slowly change to a pinkish and then whitish discharge.  For cramps or mild pain, try an over-the-counter pain medicine, such as acetaminophen (Tylenol) or ibuprofen (Advil, Motrin). Read and follow all instructions on the label.  To ease pain around the vagina or from hemorrhoids:  Put ice or a cold pack on the area for 10 to 20 minutes at a time. Put a thin cloth between the ice and your skin.  Try sitting in a few inches of warm water (sitz bath) when you can or after bowel movements.  Clean yourself with a gentle squeeze of warm water from a bottle instead of wiping with toilet paper.  Use witch hazel or hemorrhoid pads (such as Tucks).  Try using a cold compress for sore and swollen breasts. And wear a supportive bra that fits.  Ease constipation by drinking plenty of fluids and eating high-fiber foods. Ask your doctor or midwife about over-the-counter stool softeners.  Activity  Rest when you can.  Ask for help from family or friends when you need it.  If you can, have another adult in your home for at least 2 or 3 days after birth.  When you feel ready, try to get some exercise every day. For many people, walking  is a good choice. Don't do any heavy exercise until your doctor or midwife says it's okay.  Ask your doctor or midwife when it is okay to have vaginal sex.  If you don't want to get pregnant, talk to your doctor or midwife about birth control options. You can get pregnant even before your period returns. Also, you can get pregnant while you are breastfeeding.  Talk to your doctor or midwife if you want to get pregnant again. They can talk to you about when it is safe.  Emotional health  It's normal to have some sadness, anxiety, and mood swings after delivery. It may help to talk with a trusted friend or family member. You can also call the Maternal Mental Health Hotline at 4-492-[facility] (1-890.232.2643) for support. If these mood changes last more than a couple of weeks, talk to your doctor or midwife.  When should you call for help?  Share this information with your partner, family, or a friend. They can help you watch for warning signs.  Call 911  anytime you think you may need emergency care. For example, call if:    You feel you cannot stop from hurting yourself, your baby, or someone else.     You passed out (lost consciousness).     You have chest pain, are short of breath, or cough up blood.     You have a seizure.   Where to get help 24 hours a day, 7 days a week   If you or someone you know talks about suicide, self-harm, a mental health crisis, a substance use crisis, or any other kind of emotional distress, get help right away. You can:    Call the Suicide and Crisis Lifeline at 488.     Call 7-043-662-TALK (1-216.275.4247).     Text HOME to 401367 to access the Crisis Text Line.   Consider saving these numbers in your phone.  Go to [employer] for more information or to chat online.  Call your doctor or midwife now or seek immediate medical care if:    You have signs of hemorrhage (too much bleeding), such as:  Heavy vaginal bleeding. This means that you are soaking through one or more pads in an  "hour. Or you pass blood clots bigger than an egg.  Feeling dizzy or lightheaded, or you feel like you may faint.  Feeling so tired or weak that you cannot do your usual activities.  A fast or irregular heartbeat.  New or worse belly pain.     You have signs of infection, such as:  A fever.  Increased pain, swelling, warmth, or redness from an incision or wound.  Frequent or painful urination or blood in your urine.  Vaginal discharge that smells bad.  New or worse belly pain.     You have symptoms of a blood clot in your leg (called a deep vein thrombosis), such as:  Pain in the calf, back of the knee, thigh, or groin.  Swelling in the leg or groin.  A color change on the leg or groin. The skin may be reddish or purplish, depending on your usual skin color.     You have signs of preeclampsia, such as:  Sudden swelling of your face, hands, or feet.  New vision problems (such as dimness, blurring, or seeing spots).  A severe headache.     You have signs of heart failure, such as:  New or increased shortness of breath.  New or worse swelling in your legs, ankles, or feet.  Sudden weight gain, such as more than 2 to 3 pounds in a day or 5 pounds in a week.  Feeling so tired or weak that you cannot do your usual activities.     You had spinal or epidural pain relief and have:  New or worse back pain.  Increased pain, swelling, warmth, or redness at the injection site.  Tingling, weakness, or numbness in your legs or groin.   Watch closely for changes in your health, and be sure to contact your doctor or midwife if:    Your vaginal bleeding isn't decreasing.     You feel sad, anxious, or hopeless for more than a few days.     You are having problems with your breasts or breastfeeding.   Where can you learn more?  Go to https://www.healthwise.net/patiented  Enter Z768 in the search box to learn more about \"Postpartum Care at Home With Your Baby: Care Instructions.\"  Current as of: April 30, 2024  Content Version: 14.3    " 2024 "[employer].   Care instructions adapted under license by your healthcare professional. If you have questions about a medical condition or this instruction, always ask your healthcare professional. "[employer] disclaims any warranty or liability for your use of this information.

## 2025-03-11 ENCOUNTER — DOCUMENTATION ONLY (OUTPATIENT)
Dept: OTHER | Facility: CLINIC | Age: 35
End: 2025-03-11
Payer: COMMERCIAL

## 2025-03-30 ENCOUNTER — E-VISIT (OUTPATIENT)
Dept: FAMILY MEDICINE | Facility: CLINIC | Age: 35
End: 2025-03-30
Payer: COMMERCIAL

## 2025-03-30 ENCOUNTER — VIRTUAL VISIT (OUTPATIENT)
Dept: URGENT CARE | Facility: CLINIC | Age: 35
End: 2025-03-30
Payer: COMMERCIAL

## 2025-03-30 DIAGNOSIS — M54.2 NECK PAIN: Primary | ICD-10-CM

## 2025-03-30 DIAGNOSIS — M54.2 NECK PAIN: ICD-10-CM

## 2025-03-30 PROCEDURE — 98005 SYNCH AUDIO-VIDEO EST LOW 20: CPT | Performed by: NURSE PRACTITIONER

## 2025-03-30 RX ORDER — CYCLOBENZAPRINE HCL 10 MG
10 TABLET ORAL 3 TIMES DAILY PRN
Qty: 30 TABLET | Refills: 0 | Status: SHIPPED | OUTPATIENT
Start: 2025-03-30

## 2025-03-30 RX ORDER — CYCLOBENZAPRINE HCL 10 MG
10 TABLET ORAL 3 TIMES DAILY PRN
Qty: 30 TABLET | Refills: 0 | Status: SHIPPED | OUTPATIENT
Start: 2025-03-30 | End: 2025-03-30

## 2025-03-30 NOTE — PROGRESS NOTES
Fiona is a 34 year old who is being evaluated via a billable video visit.    How would you like to obtain your AVS? MyChart  If the video visit is dropped, the invitation should be resent by: Text to cell phone: [phone]  Will anyone else be joining your video visit? No      Assessment & Plan     Neck pain    - cyclobenzaprine (FLEXERIL) 10 MG tablet; Take 1 tablet (10 mg) by mouth 3 times daily as needed for muscle spasms.      MARSHAL Anderson CNP    Subjective   Fiona is a 34 year old, presenting for the following health issues:  Pain    HPI      Neck upper back pain during labor   Has been better, flared up the past 1-2 days  Need flexeril refilled (has been ongoing issue)        Objective           Vitals:  No vitals were obtained today due to virtual visit.    Physical Exam   GENERAL: alert and no distress  PSYCH: Appropriate affect, tone, and pace of words      Video-Visit Details  Time started 300 pm  Time ended 310 pm  Type of service:  Video Visit   Originating Location (pt. Location): Home    Distant Location (provider location):  Off-site  Platform used for Video Visit: Kari  Signed Electronically by: MARSHAL Anderson CNP

## 2025-03-31 NOTE — PATIENT INSTRUCTIONS
Thank you for choosing us for your care. I have placed an order for a prescription so that you can start treatment. View your full visit summary for details by clicking on the link below. Your pharmacist will able to address any questions you may have about the medication.      I did reorder the Flexeril- a small amount of this can get into the breast milk if you are breastfeeding- it is still considered safe because it's very small amounts of the medication that get into the breast milk.  You can also take Tylenol and ibuprofen and do gentle stretching if your back.  If you are interested in OMT (osteopathic manipulative treatment- see below for more information), my partner Dr. Christiano Obando does this and it is often covered by insurance the way a regular office visit is- you can call to schedule an appointment with her because that may also help.    If you're not feeling better within 2-3 weeks please schedule an appointment.  You can schedule an appointment right here in [facility], or call [phone]  If the visit is for the same symptoms as your eVisit, we'll refund the cost of your eVisit if seen within seven days.      What Is OMT (Osteopathic Manipulative Treatment)?   As part of their education, DOs (doctor of osteopathic medicine) receive special training in the musculoskeletal system (your nerves, bones and muscles).   OMT involves using the hands to diagnose, treat and prevent illness or injury.  Using OMT, your osteopathic physician will move your muscles and joints using techniques including stretching, gentle pressure and resistance.   OMT can help people of all ages and backgrounds. In addition to muscle or joint pain, it can be used to treat a variety of conditions such as asthma, sinus pain, migraines and carpal tunnel syndrome.   For more information, please visit [email or web address]      How does osteopathic manipulative therapy work?   If you're receiving OMT, you should expect a doctor to palpate  "your body with their hands. This means they may press your joints or muscles with their palms or fingers. They might also pull or rotate your limbs, trunk, or head.   Depending on your reason for receiving OMT, you might be asked to apply force as well, such as lifting your arms, while the doctor applies counterpressure.   You could remain in a particular position for 1 or 2 minutes. Sometimes, the doctor will move your body slowly and continuously, and other times they'll move your body quickly.   OMT might occasionally be uncomfortable, but it should never be painful. If you experience any pain during OMT, let your doctor know immediately.      Osteopathic manipulative therapy vs. chiropractic therapy   OMT and chiropractic therapy can look the same superficially, but they have some important differences.   Chiropractic therapy generally places a lot of emphasis on your spine, while OMT includes your entire body. Similarly, the goal of chiropractic therapy is often to alleviate pain in a specific part of your body, while OMT is part of a holistic approach to medicine that stresses that all facets of your body are interconnected, including your mental and emotional states.   Chiropractors must be licensed, but they aren't medical doctors. OMT is performed by a medical doctor.         How to Schedule OMT :   Please make an appointment for \"OMT\" with the .  Please inform them you are scheduling for evaluation for OMT with Dr. Christiano Obando for _back pain___ (ex. Back pain, neck pain, ect ).       Mini Relaxation Exercises  Source [email or web address]    Mini relaxation exercises are focused breathing techniques that help reduce  anxiety and tension immediately. You can do them with your eyes open or  closed. You can do them any place, at any time; no one will know that you are  doing them.    Good Times to Do a  Mini     Before taking an exam    While at the computer lab waiting for your document to print    " While waiting in line    When someone says something that bothers you    While waiting for the announcement of a grade    While waiting for a phone call    In the dentist s chair    When you feel overwhelmed by what you need to accomplish in the near  future    When in pain    When you want to     Mini Version 1- Breath Focused  Position yourself in a supportive comfortable chair or lying in a position that is least painful. (Often this is on your back with pillows under your knees)    Count very slowly to yourself from ten down to zero, one number for  each breath. Thus, with the first diaphragmatic breath, you say  ten  to  yourself, with the next breath, you say  nine , etc. If you start feeling  light-headed or dizzy, slow down the counting. When you get to  zero ,  see how you are feeling. If you are feeling better, great! If not, try to  do it again.    b. As you inhale, count very slowly up to four; as you exhale, count slowly  back down to one. Thus, as you inhale, you say to yourself  one, two,  three, four , as you exhale, you say to yourself  four, three, two, one .  Do this several times.    c. After each inhalation, pause for a few seconds; after you exhale, pause  again for a few seconds. Do this for several breaths.    Mini Version 2- Physical Focused  a. Massage your forehead, jaw, back of neck, and shoulders  b. Stretch and yawn  c. Walk counting four paces as you breathe in and four paces as you  breathe out  d. Coordinate your breath with any activity, for example, writing, jogging,  drawing, lifting weights, walking, etc.    Mini Version 3- Imagery Focused  Position yourself in a supportive comfortable chair or lying in a position that is least painful. (Often this is on your back with pillows under your knees)    a. Briefly picture yourself in a place you find relaxing  b. Contemplate a picture of a natural scene  c. Imagine the characteristics of one of the following or any other object  which  portrays characteristics you find calming or supportive in the  moment:    Tree (strong, rooted, expansive)    Mountain (timeless, strong, stable)    Waves (fluid, limitless)    Sun (radiant, warm)    Wind (free)    Mini Version 4- Mindfulness Focused  a. Look out the window for a few moments  b. Notice something new  c. Listen. What is the most distant sound you hear? The next distant?  d. Appreciate the sensation of being in the situation, the feel, smell, sight of  certain objects  e. Focus on being exactly where you are, keeping your thoughts from flowing  into the future or past

## 2025-06-28 ENCOUNTER — HEALTH MAINTENANCE LETTER (OUTPATIENT)
Age: 35
End: 2025-06-28

## 2025-07-28 ENCOUNTER — OFFICE VISIT (OUTPATIENT)
Dept: ORTHOPEDICS | Facility: CLINIC | Age: 35
End: 2025-07-28
Payer: COMMERCIAL

## 2025-07-28 ENCOUNTER — ANCILLARY PROCEDURE (OUTPATIENT)
Dept: GENERAL RADIOLOGY | Facility: CLINIC | Age: 35
End: 2025-07-28
Attending: STUDENT IN AN ORGANIZED HEALTH CARE EDUCATION/TRAINING PROGRAM
Payer: COMMERCIAL

## 2025-07-28 DIAGNOSIS — S76.111A STRAIN OF RIGHT QUADRICEPS MUSCLE, INITIAL ENCOUNTER: Primary | ICD-10-CM

## 2025-07-28 DIAGNOSIS — M25.561 ACUTE PAIN OF RIGHT KNEE: ICD-10-CM

## 2025-07-28 PROCEDURE — 99203 OFFICE O/P NEW LOW 30 MIN: CPT | Performed by: STUDENT IN AN ORGANIZED HEALTH CARE EDUCATION/TRAINING PROGRAM

## 2025-07-28 RX ORDER — DICLOFENAC SODIUM 75 MG/1
75 TABLET, DELAYED RELEASE ORAL 2 TIMES DAILY
Qty: 60 TABLET | Refills: 0 | Status: SHIPPED | OUTPATIENT
Start: 2025-07-28

## 2025-07-28 NOTE — LETTER
7/28/2025      Fiona Henry  [address]  [facility] [address]      Dear Colleague,    Thank you for referring your patient, Fiona Henry, to the [facility] [place]. Please see a copy of my visit note below.    ASSESSMENT & PLAN    Fiona was seen today for pain.    Diagnoses and all orders for this visit:    Strain of right quadriceps muscle, initial encounter  -     Physical Therapy  Referral; Future  -     diclofenac (VOLTAREN) 75 MG EC tablet; Take 1 tablet (75 mg) by mouth 2 times daily.    Acute pain of right knee  -     XR Knee Standing AP [place] Bilat Lat Right; Future  -     Physical Therapy  Referral; Future  -     diclofenac (VOLTAREN) 75 MG EC tablet; Take 1 tablet (75 mg) by mouth 2 times daily.      This issue is acute and Improving. Fiona presents to our clinic today to discuss her acute right knee pain. She reports onset of pain for the past month without injury, but thinks she might have strained her knee while changing her child's diaper. Radiographs taken in clinic today were reviewed with the patient and were unremarkable for acute or chronic bony abnormalities.  On examination, she is tender to palpation in the medial quadriceps muscle belly as well as along the adductor musculature, some mild pain with resisted knee extension and hip adduction.  She otherwise has full range of motion at the knee, 5/5 strength in all planes, no pain with meniscal testing and no ligamentous laxity.  We discussed these findings and treatment options including activity modifications, physical therapy, anti-inflammatory medicines, and procedural intervention.  We determined the following plan:  - Physical therapy referral placed  - Voltaren 75 mg twice daily for 5 days, as needed afterwards sent to pharmacy  - She can follow-up with me in 6 to 8 weeks if not improving for further evaluation and treatment    Alex Johnson, DO  [facility]  "SPORTS MEDICINE CLINIC [place]    -----  Chief Complaint   Patient presents with     Right Knee - Pain       SUBJECTIVE  Fiona Henry is a/an 35 year old female who is seen as a self referral for evaluation of right knee.     The patient is seen by themselves.    Onset: little over 1 month(s) ago. Patient describes pain began after hyperextending her knee when leaning over the changing table when changing her daughter a couple different times. She also experienced a similar hyperextension instance at work.  Location of Pain: right medial distal quad; when pain is \"flared up\" she notes pain over right patella  Worsened by: straight leg raise, supine 90/90 abdominal bracing, slight knee flexion when laying on the couch / bed with a pillow / bolster under knee, home exercises - completing pelvic floor PT  Better with: aleve, ice, brace  Treatments tried: rest/activity avoidance, ice, Aleve, home exercises, and velcro knee wrap brace, massage  Associated symptoms: no distal numbness or tingling; denies swelling or warmth    Orthopedic/Surgical history: NO  Social History/Occupation: works at the [employer]       REVIEW OF SYSTEMS:  Review of systems negative unless mentioned in HPI     OBJECTIVE:  There were no vitals taken for this visit.   General: healthy, alert and in no distress  Skin: no suspicious lesions or rash.  CV: distal perfusion intact   Resp: normal respiratory effort without conversational dyspnea   Psych: normal mood and affect  Gait: NORMAL  Neuro: Normal light sensory exam of RL extremity     Right Knee exam  Gait: Normal  Alignment:  [x] Normal  [] Anatomic valgus  [] Anatomic varus  Inspection: [x] Normal  [] Ecchymosis present []Other   Palpation:  Joint Tenderness: [] No Tenderness  [x] MJL [] LJL [] MCL Margin [] LCL Margin [] Pes Anserine [x] Distal Quadricep  [x] Other-adductor musculature    Peripatellar Tenderness: [x] None [] Lateral pole [] Medial pole [] Superior pole [] Inferior pole "    Patellar tendon pain: [x] None [] Present    Patellar apprehension: [x] None [] Present    Patellar motion: [x] Normal [] Abnormal  Crepitus: [x] None [] Present  Effusion: [x] None [] Trace [] 1+ [] 2+ [] 3+  Range of motion:  Flexion: 135, Extension: 0  Strength:  [x] Full in all planes, including intact extensor mechanism [] Limited as described  Neurologic: Normal sensation   Vascular: Normal pulses   Special tests:   Lachman: Negative  Anterior Drawer: Negative  Sag/quad Activation: NP  Posterior Drawer: Negative  Valgus Stress: Negative at 0/30  Varus Stress: Negative at 0/30  Jai's: Negative  Thessaly: NP     Other notable findings/comments: None       RADIOLOGY:  Final results and radiologist's interpretation, available in the [facility] health record.  Images were reviewed with the patient in the office today.  My personal interpretation of the performed imaging: Normal joint spacing and alignment of the knee.  No acute fractures or bony abnormalities.             Again, thank you for allowing me to participate in the care of your patient.        Sincerely,        Alex Johnson, DO    Electronically signed

## 2025-07-28 NOTE — PROGRESS NOTES
ASSESSMENT & PLAN    Fiona was seen today for pain.    Diagnoses and all orders for this visit:    Strain of right quadriceps muscle, initial encounter  -     Physical Therapy  Referral; Future  -     diclofenac (VOLTAREN) 75 MG EC tablet; Take 1 tablet (75 mg) by mouth 2 times daily.    Acute pain of right knee  -     XR Knee Standing AP [place] Bilat Lat Right; Future  -     Physical Therapy  Referral; Future  -     diclofenac (VOLTAREN) 75 MG EC tablet; Take 1 tablet (75 mg) by mouth 2 times daily.      This issue is acute and Improving. Fiona presents to our clinic today to discuss her acute right knee pain. She reports onset of pain for the past month without injury, but thinks she might have strained her knee while changing her child's diaper. Radiographs taken in clinic today were reviewed with the patient and were unremarkable for acute or chronic bony abnormalities.  On examination, she is tender to palpation in the medial quadriceps muscle belly as well as along the adductor musculature, some mild pain with resisted knee extension and hip adduction.  She otherwise has full range of motion at the knee, 5/5 strength in all planes, no pain with meniscal testing and no ligamentous laxity.  We discussed these findings and treatment options including activity modifications, physical therapy, anti-inflammatory medicines, and procedural intervention.  We determined the following plan:  - Physical therapy referral placed  - Voltaren 75 mg twice daily for 5 days, as needed afterwards sent to pharmacy  - She can follow-up with me in 6 to 8 weeks if not improving for further evaluation and treatment    Alex Johnson DO  [facility]    -----  Chief Complaint   Patient presents with    Right Knee - Pain       SUBJECTIVE  Fiona Henry is a/an 35 year old female who is seen as a self referral for evaluation of right knee.     The patient is seen by  "themselves.    Onset: little over 1 month(s) ago. Patient describes pain began after hyperextending her knee when leaning over the changing table when changing her daughter a couple different times. She also experienced a similar hyperextension instance at work.  Location of Pain: right medial distal quad; when pain is \"flared up\" she notes pain over right patella  Worsened by: straight leg raise, supine 90/90 abdominal bracing, slight knee flexion when laying on the couch / bed with a pillow / bolster under knee, home exercises - completing pelvic floor PT  Better with: aleve, ice, brace  Treatments tried: rest/activity avoidance, ice, Aleve, home exercises, and velcro knee wrap brace, massage  Associated symptoms: no distal numbness or tingling; denies swelling or warmth    Orthopedic/Surgical history: NO  Social History/Occupation: works at the [employer]       REVIEW OF SYSTEMS:  Review of systems negative unless mentioned in HPI     OBJECTIVE:  There were no vitals taken for this visit.   General: healthy, alert and in no distress  Skin: no suspicious lesions or rash.  CV: distal perfusion intact   Resp: normal respiratory effort without conversational dyspnea   Psych: normal mood and affect  Gait: NORMAL  Neuro: Normal light sensory exam of RL extremity     Right Knee exam  Gait: Normal  Alignment:  [x] Normal  [] Anatomic valgus  [] Anatomic varus  Inspection: [x] Normal  [] Ecchymosis present []Other   Palpation:  Joint Tenderness: [] No Tenderness  [x] MJL [] LJL [] MCL Margin [] LCL Margin [] Pes Anserine [x] Distal Quadricep  [x] Other-adductor musculature    Peripatellar Tenderness: [x] None [] Lateral pole [] Medial pole [] Superior pole [] Inferior pole    Patellar tendon pain: [x] None [] Present    Patellar apprehension: [x] None [] Present    Patellar motion: [x] Normal [] Abnormal  Crepitus: [x] None [] Present  Effusion: [x] None [] Trace [] 1+ [] 2+ [] 3+  Range of motion:  Flexion: 135, Extension: " 0  Strength:  [x] Full in all planes, including intact extensor mechanism [] Limited as described  Neurologic: Normal sensation   Vascular: Normal pulses   Special tests:   Lachman: Negative  Anterior Drawer: Negative  Sag/quad Activation: NP  Posterior Drawer: Negative  Valgus Stress: Negative at 0/30  Varus Stress: Negative at 0/30  Jai's: Negative  Thessaly: NP     Other notable findings/comments: None       RADIOLOGY:  Final results and radiologist's interpretation, available in the [facility] health record.  Images were reviewed with the patient in the office today.  My personal interpretation of the performed imaging: Normal joint spacing and alignment of the knee.  No acute fractures or bony abnormalities.